# Patient Record
Sex: FEMALE | Race: WHITE | NOT HISPANIC OR LATINO | Employment: OTHER | URBAN - METROPOLITAN AREA
[De-identification: names, ages, dates, MRNs, and addresses within clinical notes are randomized per-mention and may not be internally consistent; named-entity substitution may affect disease eponyms.]

---

## 2017-04-05 ENCOUNTER — OFFICE VISIT (OUTPATIENT)
Dept: URGENT CARE | Facility: CLINIC | Age: 77
End: 2017-04-05
Payer: MEDICARE

## 2017-04-05 VITALS
TEMPERATURE: 97.9 F | RESPIRATION RATE: 16 BRPM | OXYGEN SATURATION: 93 % | HEART RATE: 96 BPM | SYSTOLIC BLOOD PRESSURE: 158 MMHG | BODY MASS INDEX: 28.96 KG/M2 | DIASTOLIC BLOOD PRESSURE: 94 MMHG | WEIGHT: 174 LBS
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
## 2017-04-05 DIAGNOSIS — S86.912A: ICD-10-CM

## 2017-04-05 PROCEDURE — 3017F COLORECTAL CA SCREEN DOC REV: CPT | Mod: 8P | Performed by: PHYSICIAN ASSISTANT

## 2017-04-05 PROCEDURE — G8419 CALC BMI OUT NRM PARAM NOF/U: HCPCS | Performed by: PHYSICIAN ASSISTANT

## 2017-04-05 PROCEDURE — 1101F PT FALLS ASSESS-DOCD LE1/YR: CPT | Mod: 8P | Performed by: PHYSICIAN ASSISTANT

## 2017-04-05 PROCEDURE — 99214 OFFICE O/P EST MOD 30 MIN: CPT | Performed by: PHYSICIAN ASSISTANT

## 2017-04-05 PROCEDURE — G8432 DEP SCR NOT DOC, RNG: HCPCS | Performed by: PHYSICIAN ASSISTANT

## 2017-04-05 PROCEDURE — 4004F PT TOBACCO SCREEN RCVD TLK: CPT | Mod: 8P | Performed by: PHYSICIAN ASSISTANT

## 2017-04-05 PROCEDURE — 4040F PNEUMOC VAC/ADMIN/RCVD: CPT | Mod: 8P | Performed by: PHYSICIAN ASSISTANT

## 2017-04-05 RX ORDER — CYCLOBENZAPRINE HCL 5 MG
5-10 TABLET ORAL 3 TIMES DAILY PRN
Qty: 15 TAB | Refills: 0 | Status: SHIPPED | OUTPATIENT
Start: 2017-04-05 | End: 2020-07-17

## 2017-04-05 RX ORDER — METHYLPREDNISOLONE 4 MG/1
TABLET ORAL
Qty: 1 TAB | Refills: 0 | Status: SHIPPED | OUTPATIENT
Start: 2017-04-05 | End: 2018-01-06

## 2017-04-05 ASSESSMENT — ENCOUNTER SYMPTOMS
WEAKNESS: 1
SENSORY CHANGE: 0
LEG PAIN: 1
MUSCULOSKELETAL NEGATIVE: 1
FOCAL WEAKNESS: 1
NUMBNESS: 0
JOINT SWELLING: 0

## 2017-04-05 NOTE — MR AVS SNAPSHOT
Pilar Montero   2017 8:15 AM   Office Visit   MRN: [id number]    Department:  [facility]   Dept Phone:  [phone]    Description:  Female : 1940   Provider:  Zak Stevens PA-C           Reason for Visit     Leg Pain x 4 days, Lt. leg pain      Allergies as of 2017     Allergen Noted Reactions    Percocet [Apap-Fd&C Red #40 Al Calvert-Oxycodone] 2017         You were diagnosed with     Repetitive strain injury of left lower leg, initial encounter   [[id number]]         Vital Signs     Blood Pressure Pulse Temperature Respirations Weight Oxygen Saturation    158/94 mmHg 96 36.6 °C (97.9 °F) 16 78.926 kg (174 lb) 93%      Basic Information     Date Of Birth Sex Race Ethnicity Preferred Language    1940 Female White Unknown English      Health Maintenance        Date Due Completion Dates    IMM DTaP/Tdap/Td Vaccine (1 - Tdap) 3/28/1961 ---    PAP SMEAR 3/28/1963 ---    MAMMOGRAM 3/28/1982 ---    COLONOSCOPY 3/28/1992 ---    IMM ZOSTER VACCINE 3/28/2002 ---    BONE DENSITY 3/28/2007 ---    IMM PNEUMOCOCCAL 65+ (ADULT) LOW/MEDIUM RISK SERIES (1 of 2 - PCV13) 3/28/2007 ---            Current Immunizations     No immunizations on file.      Below and/or attached are the medications your provider expects you to take. Review all of your home medications and newly ordered medications with your provider and/or pharmacist. Follow medication instructions as directed by your provider and/or pharmacist. Please keep your medication list with you and share with your provider. Update the information when medications are discontinued, doses are changed, or new medications (including over-the-counter products) are added; and carry medication information at all times in the event of emergency situations     Allergies:  PERCOCET - (reactions not documented)               Medications  Valid as of: 2017 -  9:58 AM    Generic Name Brand Name Tablet Size Instructions for use    Acetaminophen-Codeine (Tab) TYLENOL #3 300-30 MG Take 1-2 Tabs by mouth every four hours as needed.        Albuterol Sulfate (Aero Soln) albuterol 108 (90 BASE) MCG/ACT Inhale 2 Puffs by mouth every four hours as needed for Shortness of Breath. or wheezing/cough        Calcium Carb-Cholecalciferol   Take  by mouth.        Cephalexin (Cap) KEFLEX 500 MG Take 1 Cap by mouth 2 times a day.        Cholecalciferol   Take  by mouth.        Cyclobenzaprine HCl (Tab) FLEXERIL 5 MG Take 1-2 Tabs by mouth 3 times a day as needed for Mild Pain or Muscle Spasms.        Glucosamine-Chondroit-Vit C-Mn   Take  by mouth.        MethylPREDNISolone (Tab) MEDROL DOSPACK 4 MG Take  by mouth. U. D.        MethylPREDNISolone (Tablet Therapy Pack) MEDROL DOSEPAK 4 MG U.D.        PredniSONE (Tab) DELTASONE 20 MG Take one tab daily        Promethazine-Codeine (Syrup) PHENERGAN-CODEINE 6.25-10 MG/5ML Take 5-10 mL by mouth 4 times a day as needed for Cough. or use qhs        Pseudoephedrine-Acetaminophen   Take  by mouth.        Pseudoephedrine-APAP-DM   Take  by mouth.        Silver Sulfadiazine (Cream) SILVADENE 1 % Apply thin layer to affected area three times daily        Valsartan-Hydrochlorothiazide   Take  by mouth.        .                 Medicines prescribed today were sent to:     [facility] [address] [place] [address] (S), [place] - [address] [facility] (S) [place] [address]    Phone: [phone] Fax: [phone]    Open 24 Hours?: No      Medication refill instructions:       If your prescription bottle indicates you have medication refills left, it is not necessary to call your provider’s office. Please contact your pharmacy and they will refill your medication.    If your prescription bottle indicates you do not have any refills left, you may request refills at any time through one of the following ways: The online Catchoom system (except Urgent Care), by calling your provider’s office, or by asking your pharmacy to contact  your provider’s office with a refill request. Medication refills are processed only during regular business hours and may not be available until the next business day. Your provider may request additional information or to have a follow-up visit with you prior to refilling your medication.   *Please Note: Medication refills are assigned a new Rx number when refilled electronically. Your pharmacy may indicate that no refills were authorized even though a new prescription for the same medication is available at the pharmacy. Please request the medicine by name with the pharmacy before contacting your provider for a refill.           NuFlick Access Code: [email or web address]  Expires: 5/5/2017  9:58 AM    Your email address is not on file at [facility].  Email Addresses are required for you to sign up for NuFlick, please contact [phone] to verify your personal information and to provide your email address prior to attempting to register for NuFlick.    Pilar Montero  [address] [place] [address]    NuFlick  A secure, online tool to manage your health information     [facility]’s NuFlick® is a secure, online tool that connects you to your personalized health information from the privacy of your home -- day or night - making it very easy for you to manage your healthcare. Once the activation process is completed, you can even access your medical information using the NuFlick renetta, which is available for free in the Apple Renetta store or Google Play store.     To learn more about NuFlick, visit [email or web address]    There are two levels of access available (as shown below):   My Chart Features  [facility] Primary Care Doctor [facility]  Specialists [facility]  Urgent  Care Non-[facility] Primary Care Doctor   Email your healthcare team securely and privately 24/7 X X X    Manage appointments: schedule your next appointment; view details of past/upcoming appointments X      Request prescription refills. X      View recent  personal medical records, including lab and immunizations X X X X   View health record, including health history, allergies, medications X X X X   Read reports about your outpatient visits, procedures, consult and ER notes X X X X   See your discharge summary, which is a recap of your hospital and/or ER visit that includes your diagnosis, lab results, and care plan X X  X     How to register for SkilledWizard:  Once your e-mail address has been verified, follow the following steps to sign up for SkilledWizard.     1. Go to  [email or web address]  2. Click on the Sign Up Now box, which takes you to the New Member Sign Up page. You will need to provide the following information:  a. Enter your SkilledWizard Access Code exactly as it appears at the top of this page. (You will not need to use this code after you’ve completed the sign-up process. If you do not sign up before the expiration date, you must request a new code.)   b. Enter your date of birth.   c. Enter your home email address.   d. Click Submit, and follow the next screen’s instructions.  3. Create a Straker Translationst ID. This will be your SkilledWizard login ID and cannot be changed, so think of one that is secure and easy to remember.  4. Create a SkilledWizard password. You can change your password at any time.  5. Enter your Password Reset Question and Answer. This can be used at a later time if you forget your password.   6. Enter your e-mail address. This allows you to receive e-mail notifications when new information is available in SkilledWizard.  7. Click Sign Up. You can now view your health information.    For assistance activating your SkilledWizard account, call [phone]

## 2017-04-05 NOTE — PROGRESS NOTES
Subjective:      Pilar Montero is a 77 y.o. female who presents with Leg Pain            Leg Pain  This is a new problem. The current episode started in the past 7 days (did vigorous exercise 4d ago, strain of L upper/lower leg; ). The problem occurs constantly. The problem has been unchanged. Associated symptoms include weakness. Pertinent negatives include no joint swelling or numbness. The symptoms are aggravated by bending and walking. She has tried rest for the symptoms. The treatment provided mild relief.       Review of Systems   Musculoskeletal: Negative.  Negative for joint swelling.   Neurological: Positive for focal weakness and weakness. Negative for sensory change and numbness.          Objective:     /94 mmHg  Pulse 96  Temp(Src) 36.6 °C (97.9 °F)  Resp 16  Wt 78.926 kg (174 lb)  SpO2 93%     Physical Exam   Constitutional: She is oriented to person, place, and time. She appears well-developed and well-nourished. No distress.   Musculoskeletal: She exhibits tenderness (gen ttp muscles hip.thigh/calf area; no swell).        Left hip: She exhibits decreased range of motion, decreased strength and tenderness. She exhibits no bony tenderness and no swelling.        Legs:  Neurological: She is alert and oriented to person, place, and time. No sensory deficit. She exhibits abnormal muscle tone. Gait abnormal. Coordination normal.   Skin: Skin is dry.   Psychiatric: She has a normal mood and affect. Her behavior is normal. Judgment and thought content normal.   Nursing note and vitals reviewed.    Filed Vitals:    04/05/17 0831   BP: 158/94   Pulse: 96   Temp: 36.6 °C (97.9 °F)   Resp: 16   Weight: 78.926 kg (174 lb)   SpO2: 93%     Current Outpatient Prescriptions on File Prior to Visit   Medication Sig Dispense Refill   • Valsartan-Hydrochlorothiazide (DIOVAN HCT PO) Take  by mouth.     • predniSONE (DELTASONE) 20 MG Tab Take one tab daily 3 Tab 0   • cephALEXin (KEFLEX) 500 MG Cap Take 1 Cap by  mouth 2 times a day. 14 Cap 0   • silver sulfADIAZINE (SILVADENE) 1 % CREA Apply thin layer to affected area three times daily 60 g 0   • Pseudoephedrine-APAP-DM (DAYQUIL PO) Take  by mouth.     • Pseudoephedrine-APAP (TYLENOL SINUS MAX ST PO) Take  by mouth.     • albuterol (VENTOLIN OR PROVENTIL) 108 (90 BASE) MCG/ACT AERS Inhale 2 Puffs by mouth every four hours as needed for Shortness of Breath. or wheezing/cough 1 Inhaler 0   • promethazine-codeine (PHENERGAN-CODEINE) 6.25-10 MG/5ML SYRP Take 5-10 mL by mouth 4 times a day as needed for Cough. or use qhs 120 mL 0   • methylPREDNISolone (MEDROL DOSPACK) 4 MG TABS Take  by mouth. U. D. 1 Each 0     No current facility-administered medications on file prior to visit.     Gargles, Cepacol lozenges, Aleve/Advil as needed for throat pain  History reviewed. No pertinent family history.  Percocet             Assessment/Plan:     1. Repetitive strain injury of left lower leg, initial encounter     · Flexeril, medrol; T-3 prn  · Heat, rest

## 2018-01-06 ENCOUNTER — OFFICE VISIT (OUTPATIENT)
Dept: URGENT CARE | Facility: CLINIC | Age: 78
End: 2018-01-06
Payer: MEDICARE

## 2018-01-06 VITALS
DIASTOLIC BLOOD PRESSURE: 70 MMHG | HEART RATE: 107 BPM | HEIGHT: 65 IN | BODY MASS INDEX: 28.76 KG/M2 | RESPIRATION RATE: 20 BRPM | SYSTOLIC BLOOD PRESSURE: 122 MMHG | TEMPERATURE: 97 F | WEIGHT: 172.6 LBS | OXYGEN SATURATION: 92 %

## 2018-01-06 DIAGNOSIS — R06.02 SHORTNESS OF BREATH: ICD-10-CM

## 2018-01-06 DIAGNOSIS — R68.89 FLU-LIKE SYMPTOMS: ICD-10-CM

## 2018-01-06 LAB
FLUAV+FLUBV AG SPEC QL IA: NEGATIVE
INT CON NEG: NEGATIVE
INT CON POS: POSITIVE

## 2018-01-06 PROCEDURE — 99214 OFFICE O/P EST MOD 30 MIN: CPT | Performed by: PHYSICIAN ASSISTANT

## 2018-01-06 PROCEDURE — 87804 INFLUENZA ASSAY W/OPTIC: CPT | Performed by: PHYSICIAN ASSISTANT

## 2018-01-06 RX ORDER — BENZONATATE 100 MG/1
100 CAPSULE ORAL 3 TIMES DAILY PRN
Qty: 60 CAP | Refills: 0 | Status: SHIPPED | OUTPATIENT
Start: 2018-01-06 | End: 2020-07-17

## 2018-01-06 RX ORDER — IPRATROPIUM BROMIDE AND ALBUTEROL SULFATE 2.5; .5 MG/3ML; MG/3ML
3 SOLUTION RESPIRATORY (INHALATION) ONCE
Status: COMPLETED | OUTPATIENT
Start: 2018-01-06 | End: 2018-01-06

## 2018-01-06 RX ORDER — CODEINE PHOSPHATE AND GUAIFENESIN 10; 100 MG/5ML; MG/5ML
5 SOLUTION ORAL NIGHTLY PRN
Qty: 50 ML | Refills: 0 | Status: SHIPPED | OUTPATIENT
Start: 2018-01-06 | End: 2018-01-16

## 2018-01-06 RX ORDER — METHYLPREDNISOLONE 4 MG/1
TABLET ORAL
Qty: 1 KIT | Refills: 0 | Status: SHIPPED | OUTPATIENT
Start: 2018-01-06 | End: 2020-07-17

## 2018-01-06 RX ORDER — ALBUTEROL SULFATE 90 UG/1
2 AEROSOL, METERED RESPIRATORY (INHALATION) EVERY 6 HOURS PRN
Qty: 8.5 G | Refills: 3 | Status: SHIPPED | OUTPATIENT
Start: 2018-01-06 | End: 2020-07-17

## 2018-01-06 RX ADMIN — IPRATROPIUM BROMIDE AND ALBUTEROL SULFATE 3 ML: 2.5; .5 SOLUTION RESPIRATORY (INHALATION) at 13:00

## 2018-01-06 ASSESSMENT — ENCOUNTER SYMPTOMS
SPUTUM PRODUCTION: 0
DIARRHEA: 0
SORE THROAT: 1
WEIGHT LOSS: 0
WHEEZING: 0
COUGH: 1
NAUSEA: 0
HEADACHES: 0
HEARTBURN: 0
ABDOMINAL PAIN: 0
VOMITING: 0
DIZZINESS: 0
HEMOPTYSIS: 0
SHORTNESS OF BREATH: 1
FEVER: 1
MYALGIAS: 1
CHILLS: 1

## 2018-01-06 NOTE — PROGRESS NOTES
"Subjective:      Pilar Montero is a 77 y.o. female who presents with Cough (x5days, cough, body aches, chills, headache, sinus congestion)        Patient is accompanied by her .     Cough   This is a new problem. The current episode started in the past 7 days (5 days). The problem has been unchanged. The problem occurs every few minutes. The cough is non-productive. Associated symptoms include chills, a fever, myalgias, nasal congestion, postnasal drip, a sore throat and shortness of breath. Pertinent negatives include no chest pain, ear pain, headaches, heartburn, hemoptysis, weight loss or wheezing. Nothing aggravates the symptoms. She has tried OTC cough suppressant for the symptoms. The treatment provided mild relief. There is no history of asthma, bronchitis, emphysema or pneumonia.     Patient presents to urgent care reporting a 5 day history of fevers, chills, body aches, dry cough, sore throat, and nasal congestion. She has been feeling more SOB with activity as well. She has never been diagnosed with any chronic lung disease, but she has been a smoker for many years. No hemoptysis, night sweats, unintended weight loss, or chest pain.     Review of Systems   Constitutional: Positive for chills and fever. Negative for weight loss.   HENT: Positive for congestion, postnasal drip and sore throat. Negative for ear pain.    Respiratory: Positive for cough and shortness of breath. Negative for hemoptysis, sputum production and wheezing.    Cardiovascular: Negative for chest pain.   Gastrointestinal: Negative for abdominal pain, diarrhea, heartburn, nausea and vomiting.   Genitourinary: Negative.    Musculoskeletal: Positive for myalgias.   Neurological: Negative for dizziness and headaches.        Objective:     /70   Pulse (!) 107   Temp 36.1 °C (97 °F)   Resp 20   Ht 1.651 m (5' 5\")   Wt 78.3 kg (172 lb 9.6 oz)   SpO2 92%   BMI 28.72 kg/m²      Physical Exam   Constitutional: She is oriented " to person, place, and time. She appears well-developed and well-nourished. No distress.   HENT:   Head: Normocephalic and atraumatic.   Right Ear: Hearing, tympanic membrane, external ear and ear canal normal.   Left Ear: Hearing, tympanic membrane, external ear and ear canal normal.   Mouth/Throat: Posterior oropharyngeal erythema present. No oropharyngeal exudate or posterior oropharyngeal edema.   Eyes: Conjunctivae are normal. Pupils are equal, round, and reactive to light. Right eye exhibits no discharge. Left eye exhibits no discharge.   Neck: Normal range of motion.   Cardiovascular: Normal rate, regular rhythm and normal heart sounds.    No murmur heard.  Pulmonary/Chest: Effort normal. No respiratory distress. She has no wheezes. She has no rales.   Decreased lung sounds throughout   Musculoskeletal: Normal range of motion.   Lymphadenopathy:     She has no cervical adenopathy.   Neurological: She is alert and oriented to person, place, and time.   Skin: Skin is warm and dry. She is not diaphoretic.   Psychiatric: She has a normal mood and affect. Her behavior is normal.   Nursing note and vitals reviewed.           PMH:  has no past medical history on file.  MEDS:   Current Outpatient Prescriptions:   •  benzonatate (TESSALON) 100 MG Cap, Take 1 Cap by mouth 3 times a day as needed for Cough., Disp: 60 Cap, Rfl: 0  •  guaifenesin-codeine (GUAIFENESIN AC) Solution oral solution, Take 5 mL by mouth at bedtime as needed for Cough for up to 10 days., Disp: 50 mL, Rfl: 0  •  MethylPREDNISolone (MEDROL DOSEPAK) 4 MG Tablet Therapy Pack, Take as directed, Disp: 1 Kit, Rfl: 0  •  albuterol 108 (90 Base) MCG/ACT Aero Soln inhalation aerosol, Inhale 2 Puffs by mouth every 6 hours as needed for Shortness of Breath., Disp: 8.5 g, Rfl: 3  •  Valsartan-Hydrochlorothiazide (DIOVAN HCT PO), Take  by mouth., Disp: , Rfl:   •  Glucosamine-Chondroit-Vit C-Mn (GLUCOSAMINE 1500 COMPLEX PO), Take  by mouth., Disp: , Rfl:   •   Calcium Carb-Cholecalciferol (CALCIUM 600 + D PO), Take  by mouth., Disp: , Rfl:   •  Cholecalciferol (VITAMIN D PO), Take  by mouth., Disp: , Rfl:   •  acetaminophen-codeine #3 (TYLENOL #3) 300-30 MG Tab, Take 1-2 Tabs by mouth every four hours as needed., Disp: 15 Tab, Rfl: 0  •  cyclobenzaprine (FLEXERIL) 5 MG tablet, Take 1-2 Tabs by mouth 3 times a day as needed for Mild Pain or Muscle Spasms., Disp: 15 Tab, Rfl: 0  •  silver sulfADIAZINE (SILVADENE) 1 % CREA, Apply thin layer to affected area three times daily, Disp: 60 g, Rfl: 0  •  Pseudoephedrine-APAP-DM (DAYQUIL PO), Take  by mouth., Disp: , Rfl:   •  Pseudoephedrine-APAP (TYLENOL SINUS MAX ST PO), Take  by mouth., Disp: , Rfl:   •  albuterol (VENTOLIN OR PROVENTIL) 108 (90 BASE) MCG/ACT AERS, Inhale 2 Puffs by mouth every four hours as needed for Shortness of Breath. or wheezing/cough, Disp: 1 Inhaler, Rfl: 0  ALLERGIES:   Allergies   Allergen Reactions   • Percocet [Apap-Fd&C Red #40 Al Calvert-Oxycodone]      SURGHX: History reviewed. No pertinent surgical history.  SOCHX:  reports that she has been smoking.  She has never used smokeless tobacco.  FH: family history is not on file.    Assessment/Plan:     1. Flu-like symptoms  - POCT Influenza A/B: NEGATIVE  - benzonatate (TESSALON) 100 MG Cap; Take 1 Cap by mouth 3 times a day as needed for Cough.  Dispense: 60 Cap; Refill: 0  - guaifenesin-codeine (GUAIFENESIN AC) Solution oral solution; Take 5 mL by mouth at bedtime as needed for Cough for up to 10 days.  Dispense: 50 mL; Refill: 0   - Will cause sedation, avoid driving, operating heavy machinery, and drinking alcohol    2. Shortness of breath    - ipratropium-albuterol (DUONEB) nebulizer solution 3 mL; 3 mL by Nebulization route Once.   - Given in clinic with significant relief of symptoms. Pulse ox was 88% upon arrival, increased to 92% on RA after breathing treatment.  - MethylPREDNISolone (MEDROL DOSEPAK) 4 MG Tablet Therapy Pack; Take as directed   Dispense: 1 Kit; Refill: 0  - albuterol 108 (90 Base) MCG/ACT Aero Soln inhalation aerosol; Inhale 2 Puffs by mouth every 6 hours as needed for Shortness of Breath.  Dispense: 8.5 g; Refill: 3    Advised patient symptoms are most likely viral in etiology, recommend supportive care. Increased fluids and rest. Call or return to office if symptoms persist or worsen. The patient demonstrated a good understanding and agreed with the treatment plan.

## 2019-01-21 ENCOUNTER — OFFICE VISIT (OUTPATIENT)
Dept: URGENT CARE | Facility: MEDICAL CENTER | Age: 79
End: 2019-01-21
Payer: MEDICARE

## 2019-01-21 ENCOUNTER — HOSPITAL ENCOUNTER (OUTPATIENT)
Dept: RADIOLOGY | Facility: MEDICAL CENTER | Age: 79
End: 2019-01-21
Attending: NURSE PRACTITIONER
Payer: MEDICARE

## 2019-01-21 ENCOUNTER — TELEPHONE (OUTPATIENT)
Dept: URGENT CARE | Facility: MEDICAL CENTER | Age: 79
End: 2019-01-21

## 2019-01-21 VITALS
SYSTOLIC BLOOD PRESSURE: 146 MMHG | BODY MASS INDEX: 29.79 KG/M2 | TEMPERATURE: 98.3 F | WEIGHT: 179 LBS | OXYGEN SATURATION: 93 % | HEART RATE: 95 BPM | DIASTOLIC BLOOD PRESSURE: 108 MMHG

## 2019-01-21 DIAGNOSIS — R06.02 SOB (SHORTNESS OF BREATH): ICD-10-CM

## 2019-01-21 DIAGNOSIS — R50.9 FEVER, UNSPECIFIED FEVER CAUSE: ICD-10-CM

## 2019-01-21 DIAGNOSIS — R05.9 COUGH: ICD-10-CM

## 2019-01-21 DIAGNOSIS — J44.1 CHRONIC OBSTRUCTIVE PULMONARY DISEASE WITH ACUTE EXACERBATION (HCC): ICD-10-CM

## 2019-01-21 DIAGNOSIS — J22 LOWER RESPIRATORY INFECTION (E.G., BRONCHITIS, PNEUMONIA, PNEUMONITIS, PULMONITIS): ICD-10-CM

## 2019-01-21 LAB
FLUAV+FLUBV AG SPEC QL IA: NORMAL
INT CON NEG: NORMAL
INT CON POS: NORMAL

## 2019-01-21 PROCEDURE — 71046 X-RAY EXAM CHEST 2 VIEWS: CPT

## 2019-01-21 PROCEDURE — 87804 INFLUENZA ASSAY W/OPTIC: CPT | Performed by: NURSE PRACTITIONER

## 2019-01-21 PROCEDURE — 99214 OFFICE O/P EST MOD 30 MIN: CPT | Performed by: NURSE PRACTITIONER

## 2019-01-21 RX ORDER — IPRATROPIUM BROMIDE AND ALBUTEROL SULFATE 2.5; .5 MG/3ML; MG/3ML
3 SOLUTION RESPIRATORY (INHALATION) ONCE
Status: COMPLETED | OUTPATIENT
Start: 2019-01-21 | End: 2019-01-21

## 2019-01-21 RX ORDER — METHYLPREDNISOLONE 4 MG/1
TABLET ORAL
Qty: 1 KIT | Refills: 0 | Status: SHIPPED | OUTPATIENT
Start: 2019-01-21 | End: 2020-07-17

## 2019-01-21 RX ORDER — ALBUTEROL SULFATE 90 UG/1
2 AEROSOL, METERED RESPIRATORY (INHALATION) EVERY 6 HOURS PRN
Qty: 8.5 G | Refills: 0 | Status: SHIPPED | OUTPATIENT
Start: 2019-01-21 | End: 2020-07-17

## 2019-01-21 RX ORDER — LOSARTAN POTASSIUM AND HYDROCHLOROTHIAZIDE 25; 100 MG/1; MG/1
1 TABLET ORAL DAILY
COMMUNITY
End: 2020-07-17

## 2019-01-21 RX ORDER — DOXYCYCLINE HYCLATE 100 MG
100 TABLET ORAL 2 TIMES DAILY
Qty: 20 TAB | Refills: 0 | Status: SHIPPED | OUTPATIENT
Start: 2019-01-21 | End: 2019-01-31

## 2019-01-21 RX ORDER — BENZONATATE 100 MG/1
100 CAPSULE ORAL 3 TIMES DAILY PRN
Qty: 60 CAP | Refills: 0 | Status: SHIPPED | OUTPATIENT
Start: 2019-01-21 | End: 2020-07-17

## 2019-01-21 RX ADMIN — IPRATROPIUM BROMIDE AND ALBUTEROL SULFATE 3 ML: 2.5; .5 SOLUTION RESPIRATORY (INHALATION) at 15:41

## 2019-01-21 ASSESSMENT — ENCOUNTER SYMPTOMS
VOMITING: 0
SORE THROAT: 0
ABDOMINAL PAIN: 0
NAUSEA: 0
SINUS PAIN: 1
CONSTIPATION: 0
TINGLING: 0
NECK PAIN: 0
DIZZINESS: 0
WEAKNESS: 1
PALPITATIONS: 0
DIARRHEA: 0
SENSORY CHANGE: 0
HEADACHES: 1
COUGH: 1
MYALGIAS: 0
SHORTNESS OF BREATH: 1
SPUTUM PRODUCTION: 0
CHILLS: 1
FEVER: 1
WHEEZING: 0
ORTHOPNEA: 0

## 2019-01-21 NOTE — PROGRESS NOTES
"Subjective:      Pilar Montero is a 78 y.o. female who presents with Cough (chest congestion, cough, hard to breathe, body aches, headache, cheakbone and eyes sore, )            HPI  Dry hacking cough x 3 days, SOB/chest tightness, smoker, denies asthma. Albuterol, Dulera, out of these meds. Last CXR in 2016. Seen by PCP in 11/2018 with blood work done, states \"no problems\".  C/o sinus HA, nasal congestion/PND, sinus facial pressure, no sore throat. Denies taking OTC decongestants. Has not seen pulmonology for COPD status. Had similar episode of SOB with cough last year at this time.     PMH:  has no past medical history on file.  MEDS:   Current Outpatient Prescriptions:   •  losartan-hydrochlorothiazide (HYZAAR) 100-25 MG per tablet, Take 1 Tab by mouth every day., Disp: , Rfl:   •  Glucosamine-Chondroit-Vit C-Mn (GLUCOSAMINE 1500 COMPLEX PO), Take  by mouth., Disp: , Rfl:   •  Glucosamine-Chondroit-Vit C-Mn (GLUCOSAMINE 1500 COMPLEX PO), Take  by mouth., Disp: , Rfl:   •  Calcium Carb-Cholecalciferol (CALCIUM 600 + D PO), Take  by mouth., Disp: , Rfl:   •  Cholecalciferol (VITAMIN D PO), Take  by mouth., Disp: , Rfl:   •  benzonatate (TESSALON) 100 MG Cap, Take 1 Cap by mouth 3 times a day as needed for Cough. (Patient not taking: Reported on 1/21/2019), Disp: 60 Cap, Rfl: 0  •  MethylPREDNISolone (MEDROL DOSEPAK) 4 MG Tablet Therapy Pack, Take as directed (Patient not taking: Reported on 1/21/2019), Disp: 1 Kit, Rfl: 0  •  albuterol 108 (90 Base) MCG/ACT Aero Soln inhalation aerosol, Inhale 2 Puffs by mouth every 6 hours as needed for Shortness of Breath. (Patient not taking: Reported on 1/21/2019), Disp: 8.5 g, Rfl: 3  •  acetaminophen-codeine #3 (TYLENOL #3) 300-30 MG Tab, Take 1-2 Tabs by mouth every four hours as needed. (Patient not taking: Reported on 1/21/2019), Disp: 15 Tab, Rfl: 0  •  cyclobenzaprine (FLEXERIL) 5 MG tablet, Take 1-2 Tabs by mouth 3 times a day as needed for Mild Pain or Muscle Spasms. " (Patient not taking: Reported on 1/21/2019), Disp: 15 Tab, Rfl: 0  •  silver sulfADIAZINE (SILVADENE) 1 % CREA, Apply thin layer to affected area three times daily (Patient not taking: Reported on 1/21/2019), Disp: 60 g, Rfl: 0  •  Valsartan-Hydrochlorothiazide (DIOVAN HCT PO), Take  by mouth., Disp: , Rfl:   •  Pseudoephedrine-APAP-DM (DAYQUIL PO), Take  by mouth., Disp: , Rfl:   •  Pseudoephedrine-APAP (TYLENOL SINUS MAX ST PO), Take  by mouth., Disp: , Rfl:   •  albuterol (VENTOLIN OR PROVENTIL) 108 (90 BASE) MCG/ACT AERS, Inhale 2 Puffs by mouth every four hours as needed for Shortness of Breath. or wheezing/cough (Patient not taking: Reported on 1/21/2019), Disp: 1 Inhaler, Rfl: 0  ALLERGIES:   Allergies   Allergen Reactions   • Percocet [Apap-Fd&C Red #40 Al Calvert-Oxycodone]      SURGHX: History reviewed. No pertinent surgical history.  SOCHX:  reports that she has been smoking Cigarettes.  She has been smoking about 0.50 packs per day. She has never used smokeless tobacco. She reports that she drinks alcohol. She reports that she does not use drugs.  FH: Family history was reviewed, no pertinent findings to report    Review of Systems   Constitutional: Positive for chills, fever and malaise/fatigue.   HENT: Positive for congestion, ear pain and sinus pain. Negative for sore throat.    Respiratory: Positive for cough and shortness of breath. Negative for sputum production and wheezing.    Cardiovascular: Negative for chest pain, palpitations and orthopnea.   Gastrointestinal: Negative for abdominal pain, constipation, diarrhea, nausea and vomiting.   Musculoskeletal: Negative for myalgias and neck pain.   Neurological: Positive for weakness and headaches. Negative for dizziness, tingling and sensory change.   All other systems reviewed and are negative.         Objective:     /108   Pulse 95   Temp 36.8 °C (98.3 °F) (Temporal)   Wt 81.2 kg (179 lb)   SpO2 93%   BMI 29.79 kg/m²      Physical Exam    Constitutional: She is oriented to person, place, and time. She appears well-developed and well-nourished. She is active and cooperative.  Non-toxic appearance. She does not have a sickly appearance. She appears ill. No distress.   HENT:   Head: Normocephalic.   Right Ear: Tympanic membrane, external ear and ear canal normal.   Left Ear: Tympanic membrane, external ear and ear canal normal.   Nose: Mucosal edema and rhinorrhea present.   Mouth/Throat: Uvula is midline. Mucous membranes are dry. No uvula swelling. No posterior oropharyngeal edema or posterior oropharyngeal erythema.   Eyes: Pupils are equal, round, and reactive to light. Conjunctivae and EOM are normal. Right eye exhibits no discharge. Left eye exhibits no discharge.   Neck: Normal range of motion.   Cardiovascular: Normal rate and regular rhythm.    Pulmonary/Chest: Effort normal. No accessory muscle usage. No respiratory distress. She has decreased breath sounds in the right upper field, the right middle field, the right lower field, the left upper field, the left middle field and the left lower field. She has no wheezes. She has no rhonchi. She has no rales.   Abdominal: Soft. Bowel sounds are normal. She exhibits no distension. There is no tenderness. There is no rebound and no guarding.   Musculoskeletal: Normal range of motion.   Lymphadenopathy:     She has no cervical adenopathy.   Neurological: She is alert and oriented to person, place, and time. She has normal strength. She is not disoriented. No cranial nerve deficit or sensory deficit. Coordination and gait normal. GCS eye subscore is 4. GCS verbal subscore is 5. GCS motor subscore is 6.   Skin: Skin is warm and dry. She is not diaphoretic.   Psychiatric: Judgment and thought content normal. Her mood appears anxious. Her speech is rapid and/or pressured. She is agitated. She is not actively hallucinating. Cognition and memory are normal.   Appears uncomfortable due to SOB, chest  "tightness. Patient did not want to do CXR at this time and wanted to do at later time.  convinced patient to \"go next door since we are already here\". Encouraged CXR to be done in order to provide lung status for infection. Agreed to go \"if the wait isn't too long\". She is attentive.   Vitals reviewed.         Duo Neb breathing treatment: Patient has chest tightness, SOB without wheeze or CP. Cough induced especially with deep breathing. After, patient states able to breathe better with mild coughing still. Air movement better.     CXR:Diffuse interstitial prominence could relate to mild pulmonary edema or atypical infection.  Stable mild cardiomegaly.  Assessment/Plan:     1. Cough    - POCT Influenza A/B: NEG  - DX-CHEST-2 VIEWS; Future  - benzonatate (TESSALON) 100 MG Cap; Take 1 Cap by mouth 3 times a day as needed for Cough.  Dispense: 60 Cap; Refill: 0    2. Fever, unspecified fever cause    - POCT Influenza A/B    3. SOB (shortness of breath)    - DX-CHEST-2 VIEWS; Future  - ipratropium-albuterol (DUONEB) nebulizer solution; 3 mL by Nebulization route Once.  - albuterol 108 (90 Base) MCG/ACT Aero Soln inhalation aerosol; Inhale 2 Puffs by mouth every 6 hours as needed for Shortness of Breath.  Dispense: 8.5 g; Refill: 0  - Mometasone Furo-Formoterol Fum 200-5 MCG/ACT Aerosol; Inhale 2 Puffs by mouth 2 Times a Day.  Dispense: 1 Inhaler; Refill: 0  - MethylPREDNISolone (MEDROL DOSEPAK) 4 MG Tablet Therapy Pack; Use as directed  Dispense: 1 Kit; Refill: 0    4. Chronic obstructive pulmonary disease with acute exacerbation (HCC)    - ipratropium-albuterol (DUONEB) nebulizer solution; 3 mL by Nebulization route Once.  - albuterol 108 (90 Base) MCG/ACT Aero Soln inhalation aerosol; Inhale 2 Puffs by mouth every 6 hours as needed for Shortness of Breath.  Dispense: 8.5 g; Refill: 0  - Mometasone Furo-Formoterol Fum 200-5 MCG/ACT Aerosol; Inhale 2 Puffs by mouth 2 Times a Day.  Dispense: 1 Inhaler; Refill: " 0    5. Lower respiratory infection (e.g., bronchitis, pneumonia, pneumonitis, pulmonitis)    - doxycycline (VIBRAMYCIN) 100 MG Tab; Take 1 Tab by mouth 2 times a day for 10 days.  Dispense: 20 Tab; Refill: 0    Increase water intake  May use Ibuprofen/Tylenol prn for fever or body aches  Get rest  May use saline nasal spray/flonase prn to flush nasal congestion   Use inhalers as directed for SOB, chest tightness with cough  Recommend smoking cessation  May use OTC cough suppressant medications like Robitussin/Delsym prn  Monitor for fevers, productive cough, SOB, CP, chest tightness, malaise- need re-evaluation    Call [phone] cell or home number for CXR results

## 2019-01-23 ENCOUNTER — TELEPHONE (OUTPATIENT)
Dept: URGENT CARE | Facility: MEDICAL CENTER | Age: 79
End: 2019-01-23

## 2019-01-23 NOTE — TELEPHONE ENCOUNTER
Patient that was seen on moday, Pilar Montero, called because they couldn't receive the prescription for Mometasone Furo-Formoterol Fum 200-5 MCG/ACT  from the [facility] pharmacy on [address] [facility] at [phone] . I called the pharmacy to see what the reason was and they need the prescriber, rick Walsh,   to change it from 200-5 mcg/act to what it comes in.  I told them they would receive a call back once it has been resolved.

## 2019-01-24 NOTE — TELEPHONE ENCOUNTER
What does it come in? It comes in 200 mcg and 100 mcg, I am not understanding the request. Please clarify. Thank you.  Cleo

## 2019-01-29 ENCOUNTER — HOSPITAL ENCOUNTER (INPATIENT)
Dept: HOSPITAL 8 - ED | Age: 79
LOS: 2 days | Discharge: HOME | DRG: 291 | End: 2019-01-31
Attending: HOSPITALIST | Admitting: HOSPITALIST
Payer: MEDICARE

## 2019-01-29 VITALS — DIASTOLIC BLOOD PRESSURE: 84 MMHG | SYSTOLIC BLOOD PRESSURE: 132 MMHG

## 2019-01-29 VITALS — BODY MASS INDEX: 32.18 KG/M2 | HEIGHT: 65 IN | WEIGHT: 193.12 LBS

## 2019-01-29 VITALS — DIASTOLIC BLOOD PRESSURE: 86 MMHG | SYSTOLIC BLOOD PRESSURE: 145 MMHG

## 2019-01-29 DIAGNOSIS — J44.9: ICD-10-CM

## 2019-01-29 DIAGNOSIS — I48.91: ICD-10-CM

## 2019-01-29 DIAGNOSIS — Z88.8: ICD-10-CM

## 2019-01-29 DIAGNOSIS — I11.0: Primary | ICD-10-CM

## 2019-01-29 DIAGNOSIS — I31.3: ICD-10-CM

## 2019-01-29 DIAGNOSIS — D68.69: ICD-10-CM

## 2019-01-29 DIAGNOSIS — E78.5: ICD-10-CM

## 2019-01-29 DIAGNOSIS — Z87.891: ICD-10-CM

## 2019-01-29 DIAGNOSIS — I50.33: ICD-10-CM

## 2019-01-29 DIAGNOSIS — E55.9: ICD-10-CM

## 2019-01-29 DIAGNOSIS — E78.00: ICD-10-CM

## 2019-01-29 DIAGNOSIS — J96.20: ICD-10-CM

## 2019-01-29 DIAGNOSIS — I07.1: ICD-10-CM

## 2019-01-29 LAB
ALBUMIN SERPL-MCNC: 3.1 G/DL (ref 3.4–5)
ANION GAP SERPL CALC-SCNC: 6 MMOL/L (ref 5–15)
BASOPHILS # BLD AUTO: 0.04 X10^3/UL (ref 0–0.1)
BASOPHILS NFR BLD AUTO: 1 % (ref 0–1)
CALCIUM SERPL-MCNC: 8.7 MG/DL (ref 8.5–10.1)
CHLORIDE SERPL-SCNC: 104 MMOL/L (ref 98–107)
CHOL/HDL RATIO: 3
CREAT SERPL-MCNC: 0.54 MG/DL (ref 0.55–1.02)
CULTURE INDICATED?: NO
EOSINOPHIL # BLD AUTO: 0.05 X10^3/UL (ref 0–0.4)
EOSINOPHIL NFR BLD AUTO: 1 % (ref 1–7)
ERYTHROCYTE [DISTWIDTH] IN BLOOD BY AUTOMATED COUNT: 13.8 % (ref 9.6–15.2)
EST. AVERAGE GLUCOSE BLD GHB EST-MCNC: 134 MG/DL (ref 0–126)
HBA1C MFR BLD: 6.3 % (ref 4.2–6.3)
HDL CHOL %: 33 % (ref 28–40)
HDL CHOLESTEROL (DIRECT): 43 MG/DL (ref 40–60)
INR PPP: 1.09 (ref 0.93–1.1)
LDL CHOLESTEROL,CALCULATED: 73 MG/DL (ref 54–169)
LDLC/HDLC SERPL: 1.7 {RATIO} (ref 0.5–3)
LYMPHOCYTES # BLD AUTO: 1.08 X10^3/UL (ref 1–3.4)
LYMPHOCYTES NFR BLD AUTO: 13 % (ref 22–44)
MCH RBC QN AUTO: 34 PG (ref 27–34.8)
MCHC RBC AUTO-ENTMCNC: 33.7 G/DL (ref 32.4–35.8)
MCV RBC AUTO: 100.8 FL (ref 80–100)
MD: NO
MICROSCOPIC: (no result)
MONOCYTES # BLD AUTO: 0.83 X10^3/UL (ref 0.2–0.8)
MONOCYTES NFR BLD AUTO: 10 % (ref 2–9)
NEUTROPHILS # BLD AUTO: 6.11 X10^3/UL (ref 1.8–6.8)
NEUTROPHILS NFR BLD AUTO: 75 % (ref 42–75)
PLATELET # BLD AUTO: 357 X10^3/UL (ref 130–400)
PMV BLD AUTO: 7.3 FL (ref 7.4–10.4)
PROTHROMBIN TIME: 11.5 SECONDS (ref 9.6–11.5)
RBC # BLD AUTO: 4.56 X10^6/UL (ref 3.82–5.3)
T4 FREE SERPL-MCNC: 1.1 NG/DL (ref 0.76–1.46)
TRIGL SERPL-MCNC: 64 MG/DL (ref 50–200)
TROPONIN I SERPL-MCNC: 0.03 NG/ML (ref 0–0.04)
TROPONIN I SERPL-MCNC: 0.04 NG/ML (ref 0–0.04)
TSH SERPL-ACNC: 1.61 MIU/L (ref 0.36–3.74)
VLDLC SERPL CALC-MCNC: 13 MG/DL (ref 0–25)

## 2019-01-29 PROCEDURE — C9898 INPNT STAY RADIOLABELED ITEM: HCPCS

## 2019-01-29 PROCEDURE — 80061 LIPID PANEL: CPT

## 2019-01-29 PROCEDURE — 85025 COMPLETE CBC W/AUTO DIFF WBC: CPT

## 2019-01-29 PROCEDURE — 87400 INFLUENZA A/B EACH AG IA: CPT

## 2019-01-29 PROCEDURE — 80048 BASIC METABOLIC PNL TOTAL CA: CPT

## 2019-01-29 PROCEDURE — 83735 ASSAY OF MAGNESIUM: CPT

## 2019-01-29 PROCEDURE — 99291 CRITICAL CARE FIRST HOUR: CPT

## 2019-01-29 PROCEDURE — 84443 ASSAY THYROID STIM HORMONE: CPT

## 2019-01-29 PROCEDURE — 84484 ASSAY OF TROPONIN QUANT: CPT

## 2019-01-29 PROCEDURE — 84439 ASSAY OF FREE THYROXINE: CPT

## 2019-01-29 PROCEDURE — 83880 ASSAY OF NATRIURETIC PEPTIDE: CPT

## 2019-01-29 PROCEDURE — 83605 ASSAY OF LACTIC ACID: CPT

## 2019-01-29 PROCEDURE — 85610 PROTHROMBIN TIME: CPT

## 2019-01-29 PROCEDURE — 82040 ASSAY OF SERUM ALBUMIN: CPT

## 2019-01-29 PROCEDURE — A9502 TC99M TETROFOSMIN: HCPCS

## 2019-01-29 PROCEDURE — 83036 HEMOGLOBIN GLYCOSYLATED A1C: CPT

## 2019-01-29 PROCEDURE — 71045 X-RAY EXAM CHEST 1 VIEW: CPT

## 2019-01-29 PROCEDURE — 94664 DEMO&/EVAL PT USE INHALER: CPT

## 2019-01-29 PROCEDURE — 36415 COLL VENOUS BLD VENIPUNCTURE: CPT

## 2019-01-29 PROCEDURE — 81003 URINALYSIS AUTO W/O SCOPE: CPT

## 2019-01-29 PROCEDURE — 78452 HT MUSCLE IMAGE SPECT MULT: CPT

## 2019-01-29 PROCEDURE — 93017 CV STRESS TEST TRACING ONLY: CPT

## 2019-01-29 PROCEDURE — 96374 THER/PROPH/DIAG INJ IV PUSH: CPT

## 2019-01-29 PROCEDURE — 93005 ELECTROCARDIOGRAM TRACING: CPT

## 2019-01-29 PROCEDURE — 93306 TTE W/DOPPLER COMPLETE: CPT

## 2019-01-29 PROCEDURE — 94640 AIRWAY INHALATION TREATMENT: CPT

## 2019-01-29 RX ADMIN — APIXABAN SCH MG: 5 TABLET, FILM COATED ORAL at 11:42

## 2019-01-29 RX ADMIN — APIXABAN SCH MG: 5 TABLET, FILM COATED ORAL at 20:06

## 2019-01-29 RX ADMIN — DILTIAZEM HYDROCHLORIDE SCH MLS/HR: 5 INJECTION INTRAVENOUS at 19:38

## 2019-01-29 RX ADMIN — METOPROLOL TARTRATE SCH MG: 25 TABLET, FILM COATED ORAL at 20:07

## 2019-01-29 RX ADMIN — DILTIAZEM HYDROCHLORIDE SCH MLS/HR: 5 INJECTION INTRAVENOUS at 09:18

## 2019-01-29 RX ADMIN — METOPROLOL TARTRATE SCH MG: 25 TABLET, FILM COATED ORAL at 13:51

## 2019-01-29 RX ADMIN — IPRATROPIUM BROMIDE AND ALBUTEROL SULFATE SCH ML: 2.5; .5 SOLUTION RESPIRATORY (INHALATION) at 21:00

## 2019-01-29 RX ADMIN — SODIUM CHLORIDE, PRESERVATIVE FREE SCH ML: 5 INJECTION INTRAVENOUS at 20:08

## 2019-01-29 RX ADMIN — SODIUM CHLORIDE, PRESERVATIVE FREE SCH ML: 5 INJECTION INTRAVENOUS at 10:30

## 2019-01-29 NOTE — NUR
PT SEEN AT  AND PRESCRIBED DOXY, TESSALON, AND INH FOR POSS PNM.  PT RPTS NO 
IMPROVEMENT AND FEELING OF HEART RACING INTERMITTANTLY.  SERA DICK AT BEDSIDE, 
ASSESSMENT, POC DISCUSSED AND ORDERS REC'D.

## 2019-01-29 NOTE — NUR
PT OOB AND AMBULATED TO BATHROOM UPRIGHT STEADY GAIT.  INCREASED SOB WITH 
ACTIVITY.  PT RTD TO ROOM W/O INCIDENT.  CALL LIGHT W/I REACH, VSS

## 2019-01-29 NOTE — NUR
PT MED AS NOTED WITH DILTIZEM WITH EFFECT.  DILT GTT STARTED AT 10MG/HR AND 
INFUSING W/O DIFFICULTY.  CALL LIGHT W/I REACH, SPOUSE AT BEDSIDE.

## 2019-01-30 VITALS — SYSTOLIC BLOOD PRESSURE: 138 MMHG | DIASTOLIC BLOOD PRESSURE: 85 MMHG

## 2019-01-30 VITALS — SYSTOLIC BLOOD PRESSURE: 148 MMHG | DIASTOLIC BLOOD PRESSURE: 88 MMHG

## 2019-01-30 VITALS — DIASTOLIC BLOOD PRESSURE: 78 MMHG | SYSTOLIC BLOOD PRESSURE: 136 MMHG

## 2019-01-30 VITALS — SYSTOLIC BLOOD PRESSURE: 110 MMHG | DIASTOLIC BLOOD PRESSURE: 71 MMHG

## 2019-01-30 LAB
ALBUMIN SERPL-MCNC: 2.7 G/DL (ref 3.4–5)
ANION GAP SERPL CALC-SCNC: 5 MMOL/L (ref 5–15)
BASOPHILS # BLD AUTO: 0.04 X10^3/UL (ref 0–0.1)
BASOPHILS NFR BLD AUTO: 1 % (ref 0–1)
CALCIUM SERPL-MCNC: 8.4 MG/DL (ref 8.5–10.1)
CHLORIDE SERPL-SCNC: 104 MMOL/L (ref 98–107)
CREAT SERPL-MCNC: 0.5 MG/DL (ref 0.55–1.02)
EOSINOPHIL # BLD AUTO: 0.1 X10^3/UL (ref 0–0.4)
EOSINOPHIL NFR BLD AUTO: 2 % (ref 1–7)
ERYTHROCYTE [DISTWIDTH] IN BLOOD BY AUTOMATED COUNT: 13.9 % (ref 9.6–15.2)
LYMPHOCYTES # BLD AUTO: 0.92 X10^3/UL (ref 1–3.4)
LYMPHOCYTES NFR BLD AUTO: 17 % (ref 22–44)
MCH RBC QN AUTO: 33.6 PG (ref 27–34.8)
MCHC RBC AUTO-ENTMCNC: 33.1 G/DL (ref 32.4–35.8)
MCV RBC AUTO: 101.6 FL (ref 80–100)
MD: NO
MONOCYTES # BLD AUTO: 0.74 X10^3/UL (ref 0.2–0.8)
MONOCYTES NFR BLD AUTO: 13 % (ref 2–9)
NEUTROPHILS # BLD AUTO: 3.77 X10^3/UL (ref 1.8–6.8)
NEUTROPHILS NFR BLD AUTO: 68 % (ref 42–75)
PLATELET # BLD AUTO: 304 X10^3/UL (ref 130–400)
PMV BLD AUTO: 7.1 FL (ref 7.4–10.4)
RBC # BLD AUTO: 4.21 X10^6/UL (ref 3.82–5.3)
TROPONIN I SERPL-MCNC: 0.03 NG/ML (ref 0–0.04)

## 2019-01-30 RX ADMIN — IPRATROPIUM BROMIDE AND ALBUTEROL SULFATE SCH ML: 2.5; .5 SOLUTION RESPIRATORY (INHALATION) at 20:29

## 2019-01-30 RX ADMIN — METOPROLOL TARTRATE SCH MG: 25 TABLET, FILM COATED ORAL at 15:19

## 2019-01-30 RX ADMIN — METOPROLOL TARTRATE SCH MG: 25 TABLET, FILM COATED ORAL at 04:52

## 2019-01-30 RX ADMIN — APIXABAN SCH MG: 5 TABLET, FILM COATED ORAL at 20:13

## 2019-01-30 RX ADMIN — IPRATROPIUM BROMIDE AND ALBUTEROL SULFATE SCH ML: 2.5; .5 SOLUTION RESPIRATORY (INHALATION) at 15:00

## 2019-01-30 RX ADMIN — METOPROLOL TARTRATE SCH MG: 25 TABLET, FILM COATED ORAL at 20:13

## 2019-01-30 RX ADMIN — IPRATROPIUM BROMIDE AND ALBUTEROL SULFATE SCH ML: 2.5; .5 SOLUTION RESPIRATORY (INHALATION) at 07:17

## 2019-01-30 RX ADMIN — IPRATROPIUM BROMIDE AND ALBUTEROL SULFATE SCH ML: 2.5; .5 SOLUTION RESPIRATORY (INHALATION) at 03:00

## 2019-01-30 RX ADMIN — SODIUM CHLORIDE, PRESERVATIVE FREE SCH ML: 5 INJECTION INTRAVENOUS at 20:13

## 2019-01-30 RX ADMIN — SODIUM CHLORIDE, PRESERVATIVE FREE SCH ML: 5 INJECTION INTRAVENOUS at 07:49

## 2019-01-30 RX ADMIN — APIXABAN SCH MG: 5 TABLET, FILM COATED ORAL at 07:49

## 2019-01-30 RX ADMIN — DILTIAZEM HYDROCHLORIDE SCH MLS/HR: 5 INJECTION INTRAVENOUS at 06:06

## 2019-01-31 VITALS — SYSTOLIC BLOOD PRESSURE: 126 MMHG | DIASTOLIC BLOOD PRESSURE: 94 MMHG

## 2019-01-31 VITALS — SYSTOLIC BLOOD PRESSURE: 150 MMHG | DIASTOLIC BLOOD PRESSURE: 81 MMHG

## 2019-01-31 VITALS — DIASTOLIC BLOOD PRESSURE: 105 MMHG | SYSTOLIC BLOOD PRESSURE: 135 MMHG

## 2019-01-31 RX ADMIN — METOPROLOL TARTRATE SCH MG: 25 TABLET, FILM COATED ORAL at 02:58

## 2019-01-31 RX ADMIN — METOPROLOL TARTRATE SCH MG: 25 TABLET, FILM COATED ORAL at 11:32

## 2019-01-31 RX ADMIN — APIXABAN SCH MG: 5 TABLET, FILM COATED ORAL at 11:31

## 2019-01-31 RX ADMIN — SODIUM CHLORIDE, PRESERVATIVE FREE SCH ML: 5 INJECTION INTRAVENOUS at 09:00

## 2019-01-31 RX ADMIN — IPRATROPIUM BROMIDE AND ALBUTEROL SULFATE SCH ML: 2.5; .5 SOLUTION RESPIRATORY (INHALATION) at 13:51

## 2019-01-31 RX ADMIN — IPRATROPIUM BROMIDE AND ALBUTEROL SULFATE SCH ML: 2.5; .5 SOLUTION RESPIRATORY (INHALATION) at 02:36

## 2019-01-31 RX ADMIN — IPRATROPIUM BROMIDE AND ALBUTEROL SULFATE SCH ML: 2.5; .5 SOLUTION RESPIRATORY (INHALATION) at 06:57

## 2019-03-15 ENCOUNTER — HOSPITAL ENCOUNTER (OUTPATIENT)
Dept: HOSPITAL 8 - CACL | Age: 79
Discharge: HOME | End: 2019-03-15
Attending: INTERNAL MEDICINE
Payer: MEDICARE

## 2019-03-15 VITALS — BODY MASS INDEX: 28.03 KG/M2 | WEIGHT: 168.21 LBS | HEIGHT: 65 IN

## 2019-03-15 VITALS — DIASTOLIC BLOOD PRESSURE: 106 MMHG | SYSTOLIC BLOOD PRESSURE: 161 MMHG

## 2019-03-15 DIAGNOSIS — I11.0: ICD-10-CM

## 2019-03-15 DIAGNOSIS — I48.91: Primary | ICD-10-CM

## 2019-03-15 DIAGNOSIS — Z79.01: ICD-10-CM

## 2019-03-15 DIAGNOSIS — J44.9: ICD-10-CM

## 2019-03-15 DIAGNOSIS — Z88.6: ICD-10-CM

## 2019-03-15 DIAGNOSIS — I50.33: ICD-10-CM

## 2019-03-15 DIAGNOSIS — Z88.8: ICD-10-CM

## 2019-03-15 DIAGNOSIS — Z88.1: ICD-10-CM

## 2019-03-15 DIAGNOSIS — F17.210: ICD-10-CM

## 2019-03-15 DIAGNOSIS — I50.84: ICD-10-CM

## 2019-03-15 LAB
ANION GAP SERPL CALC-SCNC: 4 MMOL/L (ref 5–15)
CALCIUM SERPL-MCNC: 8.2 MG/DL (ref 8.5–10.1)
CHLORIDE SERPL-SCNC: 104 MMOL/L (ref 98–107)
CREAT SERPL-MCNC: 0.49 MG/DL (ref 0.55–1.02)
INR PPP: 1.19 (ref 0.93–1.1)
PROTHROMBIN TIME: 12.4 SECONDS (ref 9.6–11.5)

## 2019-03-15 PROCEDURE — 36415 COLL VENOUS BLD VENIPUNCTURE: CPT

## 2019-03-15 PROCEDURE — 92960 CARDIOVERSION ELECTRIC EXT: CPT

## 2019-03-15 PROCEDURE — 80048 BASIC METABOLIC PNL TOTAL CA: CPT

## 2019-03-15 PROCEDURE — 85610 PROTHROMBIN TIME: CPT

## 2019-03-15 PROCEDURE — 93005 ELECTROCARDIOGRAM TRACING: CPT

## 2019-04-16 ENCOUNTER — HOSPITAL ENCOUNTER (OUTPATIENT)
Dept: HOSPITAL 8 - CACL | Age: 79
Discharge: HOME | End: 2019-04-16
Attending: INTERNAL MEDICINE
Payer: MEDICARE

## 2019-04-16 VITALS — BODY MASS INDEX: 29.55 KG/M2 | HEIGHT: 65 IN | WEIGHT: 177.36 LBS

## 2019-04-16 VITALS — DIASTOLIC BLOOD PRESSURE: 107 MMHG | SYSTOLIC BLOOD PRESSURE: 150 MMHG

## 2019-04-16 DIAGNOSIS — J44.9: ICD-10-CM

## 2019-04-16 DIAGNOSIS — F17.210: ICD-10-CM

## 2019-04-16 DIAGNOSIS — I48.91: Primary | ICD-10-CM

## 2019-04-16 DIAGNOSIS — I50.33: ICD-10-CM

## 2019-04-16 DIAGNOSIS — I11.0: ICD-10-CM

## 2019-04-16 DIAGNOSIS — Z79.01: ICD-10-CM

## 2019-04-16 LAB
ANION GAP SERPL CALC-SCNC: 5 MMOL/L (ref 5–15)
CALCIUM SERPL-MCNC: 8.3 MG/DL (ref 8.5–10.1)
CHLORIDE SERPL-SCNC: 102 MMOL/L (ref 98–107)
CREAT SERPL-MCNC: 0.62 MG/DL (ref 0.55–1.02)

## 2019-04-16 PROCEDURE — 36415 COLL VENOUS BLD VENIPUNCTURE: CPT

## 2019-04-16 PROCEDURE — 94640 AIRWAY INHALATION TREATMENT: CPT

## 2019-04-16 PROCEDURE — 92960 CARDIOVERSION ELECTRIC EXT: CPT

## 2019-04-16 PROCEDURE — 80048 BASIC METABOLIC PNL TOTAL CA: CPT

## 2019-04-16 PROCEDURE — 93005 ELECTROCARDIOGRAM TRACING: CPT

## 2020-07-09 ENCOUNTER — HOSPITAL ENCOUNTER (EMERGENCY)
Dept: HOSPITAL 8 - ED | Age: 80
End: 2020-07-09
Payer: MEDICARE

## 2020-07-09 VITALS — DIASTOLIC BLOOD PRESSURE: 77 MMHG | SYSTOLIC BLOOD PRESSURE: 143 MMHG

## 2020-07-09 VITALS — BODY MASS INDEX: 32.71 KG/M2 | HEIGHT: 64 IN | WEIGHT: 191.58 LBS
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
## 2020-07-09 DIAGNOSIS — S91.312A: Primary | ICD-10-CM

## 2020-07-09 DIAGNOSIS — F17.210: ICD-10-CM

## 2020-07-09 PROCEDURE — 12041 INTMD RPR N-HF/GENIT 2.5CM/<: CPT

## 2020-07-09 PROCEDURE — 99406 BEHAV CHNG SMOKING 3-10 MIN: CPT

## 2020-07-09 PROCEDURE — 90471 IMMUNIZATION ADMIN: CPT

## 2020-07-09 PROCEDURE — 90715 TDAP VACCINE 7 YRS/> IM: CPT

## 2020-07-09 PROCEDURE — 99284 EMERGENCY DEPT VISIT MOD MDM: CPT
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
## 2020-07-17 ENCOUNTER — OFFICE VISIT (OUTPATIENT)
Dept: ADMISSIONS | Facility: MEDICAL CENTER | Age: 80
End: 2020-07-17
Attending: COLON & RECTAL SURGERY
Payer: MEDICARE

## 2020-07-17 DIAGNOSIS — Z01.812 PRE-OPERATIVE LABORATORY EXAMINATION: ICD-10-CM

## 2020-07-17 DIAGNOSIS — Z01.812 PRE-PROCEDURAL LABORATORY EXAMINATION: ICD-10-CM

## 2020-07-17 LAB
ANION GAP SERPL CALC-SCNC: 11 MMOL/L (ref 7–16)
BUN SERPL-MCNC: 14 MG/DL (ref 8–22)
CALCIUM SERPL-MCNC: 9.5 MG/DL (ref 8.5–10.5)
CHLORIDE SERPL-SCNC: 95 MMOL/L (ref 96–112)
CO2 SERPL-SCNC: 28 MMOL/L (ref 20–33)
COVID ORDER STATUS COVID19: NORMAL
CREAT SERPL-MCNC: 0.51 MG/DL (ref 0.5–1.4)
ERYTHROCYTE [DISTWIDTH] IN BLOOD BY AUTOMATED COUNT: 53.4 FL (ref 35.9–50)
GLUCOSE SERPL-MCNC: 101 MG/DL (ref 65–99)
HCT VFR BLD AUTO: 46.9 % (ref 37–47)
HGB BLD-MCNC: 15.1 G/DL (ref 12–16)
MCH RBC QN AUTO: 33 PG (ref 27–33)
MCHC RBC AUTO-ENTMCNC: 32.2 G/DL (ref 33.6–35)
MCV RBC AUTO: 102.4 FL (ref 81.4–97.8)
PLATELET # BLD AUTO: 245 K/UL (ref 164–446)
PMV BLD AUTO: 8.8 FL (ref 9–12.9)
POTASSIUM SERPL-SCNC: 4.9 MMOL/L (ref 3.6–5.5)
RBC # BLD AUTO: 4.58 M/UL (ref 4.2–5.4)
SODIUM SERPL-SCNC: 134 MMOL/L (ref 135–145)
WBC # BLD AUTO: 6.1 K/UL (ref 4.8–10.8)

## 2020-07-17 PROCEDURE — 36415 COLL VENOUS BLD VENIPUNCTURE: CPT

## 2020-07-17 PROCEDURE — 85027 COMPLETE CBC AUTOMATED: CPT

## 2020-07-17 PROCEDURE — 80048 BASIC METABOLIC PNL TOTAL CA: CPT

## 2020-07-17 PROCEDURE — U0003 INFECTIOUS AGENT DETECTION BY NUCLEIC ACID (DNA OR RNA); SEVERE ACUTE RESPIRATORY SYNDROME CORONAVIRUS 2 (SARS-COV-2) (CORONAVIRUS DISEASE [COVID-19]), AMPLIFIED PROBE TECHNIQUE, MAKING USE OF HIGH THROUGHPUT TECHNOLOGIES AS DESCRIBED BY CMS-2020-01-R: HCPCS

## 2020-07-17 RX ORDER — FUROSEMIDE 40 MG/1
40 TABLET ORAL DAILY
COMMUNITY

## 2020-07-17 RX ORDER — METOPROLOL TARTRATE 50 MG/1
50 TABLET, FILM COATED ORAL DAILY
COMMUNITY

## 2020-07-17 RX ORDER — SPIRONOLACTONE 25 MG/1
25 TABLET ORAL DAILY
COMMUNITY

## 2020-07-17 RX ORDER — VALSARTAN 320 MG/1
320 TABLET ORAL DAILY
COMMUNITY

## 2020-07-17 RX ORDER — AMIODARONE HYDROCHLORIDE 200 MG/1
200 TABLET ORAL DAILY
COMMUNITY

## 2020-07-17 SDOH — HEALTH STABILITY: MENTAL HEALTH: HOW OFTEN DO YOU HAVE A DRINK CONTAINING ALCOHOL?: 2-3 TIMES A WEEK

## 2020-07-17 SDOH — HEALTH STABILITY: MENTAL HEALTH: HOW MANY STANDARD DRINKS CONTAINING ALCOHOL DO YOU HAVE ON A TYPICAL DAY?: 1 OR 2

## 2020-07-17 NOTE — OR NURSING
Recent cut on top of left foot, sutured, Cut on right shin, covered with tegaderm, no sutures. Dr Ha aware of both, per patient. Sutures will come out this weekend at [facility].

## 2020-07-18 LAB
SARS-COV-2 RNA RESP QL NAA+PROBE: NOTDETECTED
SPECIMEN SOURCE: NORMAL

## 2020-07-21 NOTE — OR NURSING
COVID-19 Pre-surgery screenin. Do you have an undiagnosed respiratory illness or symptoms such as coughing or sneezing? No   a. Onset of Sx No  b. Acute vs. chronic respiratory illness No    2. Do you have an unexplained fever greater than 100.4 degrees Fahrenheit or 38 degrees Celsius?     No     3. Have you had direct exposure to a patient who tested positive for Covid-19?    No     4. Have you traveled outside [facility] in the last 14 days? No    5. Have you had any loss of your sense of taste or smell? Have you had N/V or sore throat? No    Patient has been informed of visitor policy and asked to wear a mask upon entering the hospital   Yes

## 2020-07-22 ENCOUNTER — HOSPITAL ENCOUNTER (OUTPATIENT)
Facility: MEDICAL CENTER | Age: 80
End: 2020-07-22
Attending: COLON & RECTAL SURGERY | Admitting: COLON & RECTAL SURGERY
Payer: MEDICARE

## 2020-07-22 ENCOUNTER — ANESTHESIA EVENT (OUTPATIENT)
Dept: SURGERY | Facility: MEDICAL CENTER | Age: 80
End: 2020-07-22
Payer: MEDICARE

## 2020-07-22 ENCOUNTER — ANESTHESIA (OUTPATIENT)
Dept: SURGERY | Facility: MEDICAL CENTER | Age: 80
End: 2020-07-22
Payer: MEDICARE

## 2020-07-22 VITALS
BODY MASS INDEX: 32.26 KG/M2 | HEIGHT: 64 IN | RESPIRATION RATE: 18 BRPM | DIASTOLIC BLOOD PRESSURE: 90 MMHG | TEMPERATURE: 97.2 F | OXYGEN SATURATION: 94 % | SYSTOLIC BLOOD PRESSURE: 138 MMHG | WEIGHT: 188.93 LBS | HEART RATE: 77 BPM

## 2020-07-22 DIAGNOSIS — G89.18 POSTOPERATIVE PAIN: ICD-10-CM

## 2020-07-22 PROCEDURE — 160036 HCHG PACU - EA ADDL 30 MINS PHASE I: Performed by: COLON & RECTAL SURGERY

## 2020-07-22 PROCEDURE — C1781 MESH (IMPLANTABLE): HCPCS | Performed by: COLON & RECTAL SURGERY

## 2020-07-22 PROCEDURE — 700105 HCHG RX REV CODE 258: Performed by: COLON & RECTAL SURGERY

## 2020-07-22 PROCEDURE — A9270 NON-COVERED ITEM OR SERVICE: HCPCS | Performed by: ANESTHESIOLOGY

## 2020-07-22 PROCEDURE — 502571 HCHG PACK, LAP CHOLE: Performed by: COLON & RECTAL SURGERY

## 2020-07-22 PROCEDURE — 160009 HCHG ANES TIME/MIN: Performed by: COLON & RECTAL SURGERY

## 2020-07-22 PROCEDURE — 501570 HCHG TROCAR, SEPARATOR: Performed by: COLON & RECTAL SURGERY

## 2020-07-22 PROCEDURE — 501838 HCHG SUTURE GENERAL: Performed by: COLON & RECTAL SURGERY

## 2020-07-22 PROCEDURE — 160002 HCHG RECOVERY MINUTES (STAT): Performed by: COLON & RECTAL SURGERY

## 2020-07-22 PROCEDURE — 700111 HCHG RX REV CODE 636 W/ 250 OVERRIDE (IP): Performed by: ANESTHESIOLOGY

## 2020-07-22 PROCEDURE — 160025 RECOVERY II MINUTES (STATS): Performed by: COLON & RECTAL SURGERY

## 2020-07-22 PROCEDURE — 160035 HCHG PACU - 1ST 60 MINS PHASE I: Performed by: COLON & RECTAL SURGERY

## 2020-07-22 PROCEDURE — 160047 HCHG PACU  - EA ADDL 30 MINS PHASE II: Performed by: COLON & RECTAL SURGERY

## 2020-07-22 PROCEDURE — 700102 HCHG RX REV CODE 250 W/ 637 OVERRIDE(OP): Performed by: COLON & RECTAL SURGERY

## 2020-07-22 PROCEDURE — 501583 HCHG TROCAR, THRD CAN&SEAL 5X100: Performed by: COLON & RECTAL SURGERY

## 2020-07-22 PROCEDURE — 160048 HCHG OR STATISTICAL LEVEL 1-5: Performed by: COLON & RECTAL SURGERY

## 2020-07-22 PROCEDURE — 500064 HCHG BINDER, 4-PANEL MED/LG: Performed by: COLON & RECTAL SURGERY

## 2020-07-22 PROCEDURE — 700101 HCHG RX REV CODE 250: Performed by: COLON & RECTAL SURGERY

## 2020-07-22 PROCEDURE — 700102 HCHG RX REV CODE 250 W/ 637 OVERRIDE(OP): Performed by: ANESTHESIOLOGY

## 2020-07-22 PROCEDURE — A9270 NON-COVERED ITEM OR SERVICE: HCPCS | Performed by: COLON & RECTAL SURGERY

## 2020-07-22 PROCEDURE — 160039 HCHG SURGERY MINUTES - EA ADDL 1 MIN LEVEL 3: Performed by: COLON & RECTAL SURGERY

## 2020-07-22 PROCEDURE — 160046 HCHG PACU - 1ST 60 MINS PHASE II: Performed by: COLON & RECTAL SURGERY

## 2020-07-22 PROCEDURE — 501571 HCHG TROCAR, SEPARATOR 12X100: Performed by: COLON & RECTAL SURGERY

## 2020-07-22 PROCEDURE — 700101 HCHG RX REV CODE 250: Performed by: ANESTHESIOLOGY

## 2020-07-22 PROCEDURE — 160028 HCHG SURGERY MINUTES - 1ST 30 MINS LEVEL 3: Performed by: COLON & RECTAL SURGERY

## 2020-07-22 DEVICE — MESH VENTRALIGHT 4 X 6 INCH - (1EA/CA): Type: IMPLANTABLE DEVICE | Site: ABDOMEN | Status: FUNCTIONAL

## 2020-07-22 RX ORDER — OXYCODONE HCL 5 MG/5 ML
5 SOLUTION, ORAL ORAL
Status: COMPLETED | OUTPATIENT
Start: 2020-07-22 | End: 2020-07-22

## 2020-07-22 RX ORDER — ONDANSETRON 4 MG/1
4 TABLET, ORALLY DISINTEGRATING ORAL EVERY 6 HOURS PRN
Qty: 10 TAB | Refills: 0 | Status: SHIPPED | OUTPATIENT
Start: 2020-07-22

## 2020-07-22 RX ORDER — HYDROCODONE BITARTRATE AND ACETAMINOPHEN 5; 325 MG/1; MG/1
1 TABLET ORAL EVERY 4 HOURS PRN
Qty: 30 TAB | Refills: 0 | Status: SHIPPED | OUTPATIENT
Start: 2020-07-22 | End: 2020-07-27

## 2020-07-22 RX ORDER — SODIUM CHLORIDE, SODIUM LACTATE, POTASSIUM CHLORIDE, CALCIUM CHLORIDE 600; 310; 30; 20 MG/100ML; MG/100ML; MG/100ML; MG/100ML
INJECTION, SOLUTION INTRAVENOUS CONTINUOUS
Status: DISCONTINUED | OUTPATIENT
Start: 2020-07-22 | End: 2020-07-22 | Stop reason: HOSPADM

## 2020-07-22 RX ORDER — CEFAZOLIN SODIUM 1 G/3ML
INJECTION, POWDER, FOR SOLUTION INTRAMUSCULAR; INTRAVENOUS PRN
Status: DISCONTINUED | OUTPATIENT
Start: 2020-07-22 | End: 2020-07-22 | Stop reason: SURG

## 2020-07-22 RX ORDER — HYDROMORPHONE HYDROCHLORIDE 1 MG/ML
0.2 INJECTION, SOLUTION INTRAMUSCULAR; INTRAVENOUS; SUBCUTANEOUS
Status: DISCONTINUED | OUTPATIENT
Start: 2020-07-22 | End: 2020-07-22 | Stop reason: HOSPADM

## 2020-07-22 RX ORDER — HYDROMORPHONE HYDROCHLORIDE 1 MG/ML
0.4 INJECTION, SOLUTION INTRAMUSCULAR; INTRAVENOUS; SUBCUTANEOUS
Status: DISCONTINUED | OUTPATIENT
Start: 2020-07-22 | End: 2020-07-22 | Stop reason: HOSPADM

## 2020-07-22 RX ORDER — DIPHENHYDRAMINE HYDROCHLORIDE 50 MG/ML
12.5 INJECTION INTRAMUSCULAR; INTRAVENOUS
Status: DISCONTINUED | OUTPATIENT
Start: 2020-07-22 | End: 2020-07-22 | Stop reason: HOSPADM

## 2020-07-22 RX ORDER — ONDANSETRON 2 MG/ML
4 INJECTION INTRAMUSCULAR; INTRAVENOUS
Status: COMPLETED | OUTPATIENT
Start: 2020-07-22 | End: 2020-07-22

## 2020-07-22 RX ORDER — AMOXICILLIN 500 MG/1
500 CAPSULE ORAL 3 TIMES DAILY
Qty: 15 CAP | Refills: 0 | Status: SHIPPED | OUTPATIENT
Start: 2020-07-22 | End: 2020-07-27

## 2020-07-22 RX ORDER — OXYCODONE HCL 5 MG/5 ML
10 SOLUTION, ORAL ORAL
Status: COMPLETED | OUTPATIENT
Start: 2020-07-22 | End: 2020-07-22

## 2020-07-22 RX ORDER — HYDROMORPHONE HYDROCHLORIDE 1 MG/ML
0.1 INJECTION, SOLUTION INTRAMUSCULAR; INTRAVENOUS; SUBCUTANEOUS
Status: DISCONTINUED | OUTPATIENT
Start: 2020-07-22 | End: 2020-07-22 | Stop reason: HOSPADM

## 2020-07-22 RX ORDER — BUPIVACAINE HYDROCHLORIDE AND EPINEPHRINE 5; 5 MG/ML; UG/ML
INJECTION, SOLUTION EPIDURAL; INTRACAUDAL; PERINEURAL
Status: DISCONTINUED | OUTPATIENT
Start: 2020-07-22 | End: 2020-07-22 | Stop reason: HOSPADM

## 2020-07-22 RX ORDER — DEXAMETHASONE SODIUM PHOSPHATE 4 MG/ML
INJECTION, SOLUTION INTRA-ARTICULAR; INTRALESIONAL; INTRAMUSCULAR; INTRAVENOUS; SOFT TISSUE PRN
Status: DISCONTINUED | OUTPATIENT
Start: 2020-07-22 | End: 2020-07-22 | Stop reason: SURG

## 2020-07-22 RX ORDER — HALOPERIDOL 5 MG/ML
1 INJECTION INTRAMUSCULAR
Status: DISCONTINUED | OUTPATIENT
Start: 2020-07-22 | End: 2020-07-22 | Stop reason: HOSPADM

## 2020-07-22 RX ORDER — BACITRACIN 50000 [IU]/1
INJECTION, POWDER, FOR SOLUTION INTRAMUSCULAR
Status: DISCONTINUED | OUTPATIENT
Start: 2020-07-22 | End: 2020-07-22 | Stop reason: HOSPADM

## 2020-07-22 RX ORDER — ONDANSETRON 2 MG/ML
INJECTION INTRAMUSCULAR; INTRAVENOUS PRN
Status: DISCONTINUED | OUTPATIENT
Start: 2020-07-22 | End: 2020-07-22 | Stop reason: SURG

## 2020-07-22 RX ORDER — AMOXICILLIN 500 MG/1
500 CAPSULE ORAL 3 TIMES DAILY
Qty: 30 CAP | Refills: 0 | Status: SHIPPED | OUTPATIENT
Start: 2020-07-22 | End: 2020-07-22 | Stop reason: SDUPTHER

## 2020-07-22 RX ADMIN — FENTANYL CITRATE 25 MCG: 50 INJECTION INTRAMUSCULAR; INTRAVENOUS at 12:19

## 2020-07-22 RX ADMIN — POVIDONE-IODINE 15 ML: 10 SOLUTION TOPICAL at 10:42

## 2020-07-22 RX ADMIN — LIDOCAINE HYDROCHLORIDE 0.5 ML: 10 INJECTION, SOLUTION EPIDURAL; INFILTRATION; INTRACAUDAL at 10:42

## 2020-07-22 RX ADMIN — FENTANYL CITRATE 25 MCG: 50 INJECTION INTRAMUSCULAR; INTRAVENOUS at 12:17

## 2020-07-22 RX ADMIN — ONDANSETRON 4 MG: 2 INJECTION INTRAMUSCULAR; INTRAVENOUS at 11:35

## 2020-07-22 RX ADMIN — FENTANYL CITRATE 100 MCG: 50 INJECTION, SOLUTION INTRAMUSCULAR; INTRAVENOUS at 11:31

## 2020-07-22 RX ADMIN — HYDROMORPHONE HYDROCHLORIDE 0.4 MG: 1 INJECTION, SOLUTION INTRAMUSCULAR; INTRAVENOUS; SUBCUTANEOUS at 12:32

## 2020-07-22 RX ADMIN — SODIUM CHLORIDE, POTASSIUM CHLORIDE, SODIUM LACTATE AND CALCIUM CHLORIDE: 600; 310; 30; 20 INJECTION, SOLUTION INTRAVENOUS at 10:42

## 2020-07-22 RX ADMIN — FENTANYL CITRATE 150 MCG: 50 INJECTION, SOLUTION INTRAMUSCULAR; INTRAVENOUS at 11:36

## 2020-07-22 RX ADMIN — FENTANYL CITRATE 50 MCG: 50 INJECTION INTRAMUSCULAR; INTRAVENOUS at 12:21

## 2020-07-22 RX ADMIN — HYDROMORPHONE HYDROCHLORIDE 0.4 MG: 1 INJECTION, SOLUTION INTRAMUSCULAR; INTRAVENOUS; SUBCUTANEOUS at 12:27

## 2020-07-22 RX ADMIN — OXYCODONE HYDROCHLORIDE 5 MG: 5 SOLUTION ORAL at 12:14

## 2020-07-22 RX ADMIN — PROPOFOL 200 MG: 10 INJECTION, EMULSION INTRAVENOUS at 11:29

## 2020-07-22 RX ADMIN — ROCURONIUM BROMIDE 30 MG: 10 INJECTION, SOLUTION INTRAVENOUS at 11:29

## 2020-07-22 RX ADMIN — CEFAZOLIN 2 G: 330 INJECTION, POWDER, FOR SOLUTION INTRAMUSCULAR; INTRAVENOUS at 11:31

## 2020-07-22 RX ADMIN — HYDROMORPHONE HYDROCHLORIDE 0.2 MG: 1 INJECTION, SOLUTION INTRAMUSCULAR; INTRAVENOUS; SUBCUTANEOUS at 12:54

## 2020-07-22 RX ADMIN — HYDROMORPHONE HYDROCHLORIDE 0.4 MG: 1 INJECTION, SOLUTION INTRAMUSCULAR; INTRAVENOUS; SUBCUTANEOUS at 12:47

## 2020-07-22 RX ADMIN — HYDROMORPHONE HYDROCHLORIDE 0.2 MG: 1 INJECTION, SOLUTION INTRAMUSCULAR; INTRAVENOUS; SUBCUTANEOUS at 12:37

## 2020-07-22 RX ADMIN — DEXAMETHASONE SODIUM PHOSPHATE 8 MG: 4 INJECTION, SOLUTION INTRA-ARTICULAR; INTRALESIONAL; INTRAMUSCULAR; INTRAVENOUS; SOFT TISSUE at 11:36

## 2020-07-22 RX ADMIN — HYDROMORPHONE HYDROCHLORIDE 0.4 MG: 1 INJECTION, SOLUTION INTRAMUSCULAR; INTRAVENOUS; SUBCUTANEOUS at 12:42

## 2020-07-22 RX ADMIN — ONDANSETRON 4 MG: 2 INJECTION INTRAMUSCULAR; INTRAVENOUS at 14:46

## 2020-07-22 RX ADMIN — SUGAMMADEX 200 MG: 100 INJECTION, SOLUTION INTRAVENOUS at 11:45

## 2020-07-22 SDOH — HEALTH STABILITY: MENTAL HEALTH: HOW OFTEN DO YOU HAVE 6 OR MORE DRINKS ON ONE OCCASION?: NEVER

## 2020-07-22 ASSESSMENT — PAIN SCALES - GENERAL: PAIN_LEVEL: 1

## 2020-07-22 NOTE — OR NURSING
@1411 Pt arrived to Phase 2. Pt has complaints of moderate to severe abdominal pain during mobilization. Pt has no complaints of nausea. O2 saturation at 93% on 3L oxymask. Pt states she does not normally require oxygen during the day, only uses oxygen at night for sleep. Vital signs stable otherwise.    @1527 Pt O2 at 92% on 3L. Pt able to stand and sit with minor assistance from RN. Pts  states he can bring O2 tank from home to transport patient home and use Pts O2 concentrator at home afterwards.     @1610 Dr. Banegas notified of patients O2 status. Dr. Banegas okay with patient being discharged with home O2. Pts  to discharge room.    @1634 Pt ambulated from chair to bathroom, able to void without issue. Discharge instructions discussed with patient and patients  at bedside. Pt verbalizes understanding. PIV removed, Pt able to dress self with assistance of .    @1646 Pt discharged via wheelchair to home with Pts  and all belongings and prescriptions.

## 2020-07-22 NOTE — ANESTHESIA PROCEDURE NOTES
Airway    Date/Time: 7/22/2020 11:32 AM  Performed by: Satya Banegas M.D.  Authorized by: Satya Banegas M.D.     Location:  OR  Urgency:  Elective  Difficult Airway: No    Indications for Airway Management:  Anesthesia      Spontaneous Ventilation: present    Sedation Level:  Deep  Preoxygenated: Yes    Patient Position:  Sniffing  MILS Maintained Throughout: No    Mask Difficulty Assessment:  0 - not attempted  Final Airway Type:  Endotracheal airway  Final Endotracheal Airway:  ETT  Cuffed: Yes    Technique Used for Successful ETT Placement:  Direct laryngoscopy  Devices/Methods Used in Placement:  Intubating stylet    Blade Type:  Elly  Laryngoscope Blade/Videolaryngoscope Blade Size:  4  ETT Size (mm):  7.0  Measured from:  Gums  ETT to Gums (cm):  21  Placement Verified by: auscultation and capnometry    Cormack-Lehane Classification:  Grade IIa - partial view of glottis  Number of Attempts at Approach:  1

## 2020-07-22 NOTE — ANESTHESIA PREPROCEDURE EVALUATION
Relevant Problems   No relevant active problems       Physical Exam    Airway   Mallampati: II  TM distance: >3 FB  Neck ROM: full       Cardiovascular - normal exam  Rhythm: regular  Rate: normal     Dental - normal exam           Pulmonary   Breath sounds clear to auscultation     Abdominal - normal exam     Neurological - normal exam                 Anesthesia Plan    ASA 2       Plan - general       Airway plan will be ETT      Plan Factors:   Patient was not previously instructed to abstain from smoking on day of procedure.  Patient did not smoke on day of procedure.      Induction: intravenous          Informed Consent:    Anesthetic plan and risks discussed with patient.    Use of blood products discussed with: patient whom.

## 2020-07-22 NOTE — ANESTHESIA POSTPROCEDURE EVALUATION
Patient: Pilar Montero    Procedure Summary     Date:  07/22/20 Room / Location:  [place] [address] / SURGERY [facility]    Anesthesia Start:  1123 Anesthesia Stop:  1159    Procedure:  REPAIR, HERNIA, VENTRAL, LAPAROSCOPIC- INCISIONAL HERNIA WITH MESH (Abdomen) Diagnosis:  (INCISIONAL HERNIA)    Surgeon:  Kashif Ha M.D. Responsible Provider:  Satya Banegas M.D.    Anesthesia Type:  general ASA Status:  2          Final Anesthesia Type: general  Last vitals  BP   Blood Pressure : 147/95    Temp   36.6 °C (97.8 °F)    Pulse   Pulse: 85   Resp   20    SpO2   90 %      Anesthesia Post Evaluation    Patient location during evaluation: PACU  Patient participation: complete - patient participated  Level of consciousness: awake  Pain score: 1    Airway patency: patent  Anesthetic complications: no  Cardiovascular status: adequate  Respiratory status: acceptable  Hydration status: acceptable    PONV: none           Nurse Pain Score: 0 (NPRS)

## 2020-07-22 NOTE — OR NURSING
@1411 Pt arrived to Phase 2. Pt has complaints of moderate to severe abdominal pain during mobilization. Pt has no complaints of nausea. O2 saturation at 93% on 3L oxymask. Pt states she does not normally require oxygen during the day, only uses oxygen at night for sleep. Vital signs stable otherwise.

## 2020-07-22 NOTE — ANESTHESIA QCDR
2019 [facility] Clinical Data Registry (for Quality Improvement)     Postoperative nausea/vomiting risk protocol (Adult = 18 yrs and Pediatric 3-17 yrs)- (430 and 463)  General inhalation anesthetic (NOT TIVA) with PONV risk factors: Yes  Provision of anti-emetic therapy with at least 2 different classes of agents: Yes   Patient DID NOT receive anti-emetic therapy and reason is documented in Medical Record:  N/A    Multimodal Pain Management- (477)  Non-emergent surgery AND patient age >= 18: Yes  Use of Multimodal Pain Management, two or more drugs and/or interventions, NOT including systemic opioids: No  Exception: Documented allergy to multiple classes of analgesics: No       Smoking Abstinence (404)  Patient is current smoker (cigarette, pipe, e-cig, marijuanna): No  Elective Surgery:   Abstinence instructions provided prior to day of surgery:   Patient abstained from smoking on day of surgery:     Pre-Op Beta-Blocker in Isolated CABG (44)  Isolated CABG AND patient age >= 18: No  Beta-blocker admin within 24 hours of surgical incision:   Exception:of medical reason(s) for not administering beta blocker within 24 hours prior to surgical incision (e.g., not  indicated,other medical reason):     PACU assessment of acute postoperative pain prior to Anesthesia Care End- Applies to Patients Age = 18- (ABG7)  Initial PACU pain score is which of the following: < 7/10  Patient unable to report pain score: N/A    Post-anesthetic transfer of care checklist/protocol to PACU/ICU- (426 and 427)  Upon conclusion of case, patient transferred to which of the following locations: PACU/Non-ICU  Use of transfer checklist/protocol: Yes  Exclusion: Service Performed in Patient Hospital Room (and thus did not require transfer): N/A  Unplanned admission to ICU related to anesthesia service up through end of PACU care- (MD51)  Unplanned admission to ICU (not initially anticipated at anesthesia start time): No

## 2020-07-22 NOTE — OR NURSING
Pt on 2 L oxy-mask, pt breaths thru mouth.  Pt's baseline is 5 L NC at night, COPD.  No c/o nausea, tolerating PO fluids and medication.  Abdominal surgical pain now tolerable down from 8/10 to 5/10.  Abdominal binder and ice pack in place.  VSS, afebrile, VALDEZ, A/O x4.  Pt sleepy, wakes easily to voice.  Pt is [place].        Prescriptions for Norco, Zofran and Amoxil on pt chart.   Transferred to discharged on oxygen with surgical mask in place.   Oxygen tank 50% full.

## 2020-07-22 NOTE — ANESTHESIA TIME REPORT
Anesthesia Start and Stop Event Times     Date Time Event    7/22/2020 1111 Ready for Procedure     1123 Anesthesia Start     1159 Anesthesia Stop        Responsible Staff  07/22/20    Name Role Begin End    Satya Banegas M.D. Anesth 1123 1159        Preop Diagnosis (Free Text):  Pre-op Diagnosis     INCISIONAL HERNIA        Preop Diagnosis (Codes):    Post op Diagnosis  Incisional hernia      Premium Reason  Non-Premium    Comments:

## 2020-07-22 NOTE — DISCHARGE INSTRUCTIONS
ACTIVITY: Rest and take it easy for the first 24 hours.  A responsible adult is recommended to remain with you during that time.  It is normal to feel sleepy.  We encourage you to not do anything that requires balance, judgment or coordination.    MILD FLU-LIKE SYMPTOMS ARE NORMAL. YOU MAY EXPERIENCE GENERALIZED MUSCLE ACHES, THROAT IRRITATION, HEADACHE AND/OR SOME NAUSEA.    FOR 24 HOURS DO NOT:  Drive, operate machinery or run household appliances.  Drink beer or alcoholic beverages.   Make important decisions or sign legal documents.    SPECIAL INSTRUCTIONS:     Hernia Repair Discharge Instructions:   1. ACTIVITIES: Upon discharge from the hospital, the day of surgery it is requested that you do no significant physical activity and limit mental activities, as you have had sedation. The day after surgery, you may resume activities of daily living, but for 4-6 weeks, it is recommended that you do no strenuous activities or heavy lifting (greater than 15 pounds). You should continue to wear the abdominal binder for the next 4-6 weeks. This will help with discomfort and hernia recurrence.     2. DRIVING: You may drive whenever you are off pain medications and are able to perform the activities needed to drive, i.e. turning, bending, twisting, etc.     3. WOUND: It is not unusual for patients to experience swelling and even bruising at the hernia repair site.     4. ICE: please use ice on the wound to decrease the swelling for the first 24 hours and then discontinue.     5. BATHING: The dressing can be removed two days after surgery and you may then allow the wound to get wet in a shower as normal, but avoid submersion in water (tub bath) for at least a week.    6. HOME MEDICATIONS/PAIN MEDICATION: You may continue your home medications and you may restart your Eliquis tomorrow. You will be given a prescription for pain medication at discharge. Please take these as directed. It is important to remember not to take  medications on an empty stomach as this may cause nausea.     7. BOWEL FUNCTION: After hernia repair, it is not uncommon for patients to experience constipation. This is due to decreasing activity levels as well as pain medications. You may wish to use a stool softener beginning immediately after surgery, and you may or may not need to use a laxative (Miralax, Milk of Magnesia, Senokot, etc.) as well.    8. CALL IF YOU HAVE: (1) Fevers to more than 101.00 F, (2) Unusual chest or leg pain, (3) Drainage or fluid from incision that may be foul smelling, increased tenderness or soreness at the wound or the wound edges are no longer together, redness or swelling at the incision site. Please do not hesitate to call with any other questions.     9. APPOINTMENT: Contact our office at [phone] for a follow-up appointment in 1 to 2 weeks following your procedure. If you have any additional questions, please do not hesitate to call the office and speak to either myself or the physician on call.     Office address: [address] [place] [address], [address] [address] [facility] [phone]     DIET: To avoid nausea, slowly advance diet as tolerated, avoiding spicy or greasy foods for the first day.  Add more substantial food to your diet according to your physician's instructions.  Babies can be fed formula or breast milk as soon as they are hungry.  INCREASE FLUIDS AND FIBER TO AVOID CONSTIPATION.    SURGICAL DRESSING/BATHING: See special instructions    FOLLOW-UP APPOINTMENT:  A follow-up appointment should be arranged with your doctor in 1-2 weeks; call to schedule.    You should CALL YOUR PHYSICIAN if you develop:  Fever greater than 101 degrees F.  Pain not relieved by medication, or persistent nausea or vomiting.  Excessive bleeding (blood soaking through dressing) or unexpected drainage from the wound.  Extreme redness or swelling around the incision site, drainage of pus or foul smelling  drainage.  Inability to urinate or empty your bladder within 8 hours.  Problems with breathing or chest pain.    You should call 911 if you develop problems with breathing or chest pain.  If you are unable to contact your doctor or surgical center, you should go to the nearest emergency room or urgent care center.  Physician's telephone #: [phone]    If any questions arise, call your doctor.  If your doctor is not available, please feel free to call the Surgical Center at [phone].  The Center is open Monday through Friday from 7AM to 7PM.  You can also call the [facility] HOTLINE open 24 hours/day, 7 days/week and speak to a nurse at [phone], or toll free at [phone].    A registered nurse may call you a few days after your surgery to see how you are doing after your procedure.    MEDICATIONS: Resume taking daily medication.  Take prescribed pain medication with food.  If no medication is prescribed, you may take non-aspirin pain medication if needed.  PAIN MEDICATION CAN BE VERY CONSTIPATING.  Take a stool softener or laxative such as senokot, pericolace, or milk of magnesia if needed.    Prescription given for amoxicillin (antibiotic), norco (for pain), zofran (for nausea).  Last pain medication oxycodone 5mg given at 12:14 pm.    If your physician has prescribed pain medication that includes Acetaminophen (Tylenol), do not take additional Acetaminophen (Tylenol) while taking the prescribed medication.    Depression / Suicide Risk    As you are discharged from this [facility] facility, it is important to learn how to keep safe from harming yourself.    Recognize the warning signs:  · Abrupt changes in personality, positive or negative- including increase in energy   · Giving away possessions  · Change in eating patterns- significant weight changes-  positive or negative  · Change in sleeping patterns- unable to sleep or sleeping all the time   · Unwillingness or inability to  communicate  · Depression  · Unusual sadness, discouragement and loneliness  · Talk of wanting to die  · Neglect of personal appearance   · Rebelliousness- reckless behavior  · Withdrawal from people/activities they love  · Confusion- inability to concentrate     If you or a loved one observes any of these behaviors or has concerns about self-harm, here's what you can do:  · Talk about it- your feelings and reasons for harming yourself  · Remove any means that you might use to hurt yourself (examples: pills, rope, extension cords, firearm)  · Get professional help from the community (Mental Health, Substance Abuse, psychological counseling)  · Do not be alone:Call your Safe Contact- someone whom you trust who will be there for you.  · Call your local CRISIS HOTLINE 736-8016 or 692-360-0589  · Call your local Children's Mobile Crisis Response Team [place] (502) 842-0693 or www.[facility]  · Call the toll free National Suicide Prevention Hotlines   · National Suicide Prevention Lifeline 542-005-GWBM (9710)  · National Hope Line Network 800-SUICIDE (140-7236)

## 2021-01-05 ENCOUNTER — HOSPITAL ENCOUNTER (OUTPATIENT)
Dept: HOSPITAL 8 - CFH | Age: 81
Discharge: HOME | End: 2021-01-05
Attending: INTERNAL MEDICINE
Payer: MEDICARE

## 2021-01-05 DIAGNOSIS — I11.0: ICD-10-CM

## 2021-01-05 DIAGNOSIS — I08.3: Primary | ICD-10-CM

## 2021-01-05 DIAGNOSIS — I48.0: ICD-10-CM

## 2021-01-05 DIAGNOSIS — I50.32: ICD-10-CM

## 2021-01-05 PROCEDURE — 93017 CV STRESS TEST TRACING ONLY: CPT

## 2021-01-05 PROCEDURE — A9502 TC99M TETROFOSMIN: HCPCS

## 2021-01-05 PROCEDURE — 78452 HT MUSCLE IMAGE SPECT MULT: CPT

## 2021-01-05 PROCEDURE — 93306 TTE W/DOPPLER COMPLETE: CPT

## 2021-01-15 DIAGNOSIS — Z23 NEED FOR VACCINATION: ICD-10-CM

## 2021-02-18 ENCOUNTER — HOSPITAL ENCOUNTER (OUTPATIENT)
Dept: RADIOLOGY | Facility: MEDICAL CENTER | Age: 81
End: 2021-02-18
Attending: PHYSICIAN ASSISTANT
Payer: MEDICARE

## 2021-02-18 DIAGNOSIS — F17.210 CIGARETTE SMOKER: ICD-10-CM

## 2021-02-18 PROCEDURE — 71045 X-RAY EXAM CHEST 1 VIEW: CPT

## 2021-03-30 ENCOUNTER — HOSPITAL ENCOUNTER (OUTPATIENT)
Dept: HOSPITAL 8 - CFH | Age: 81
Discharge: HOME | End: 2021-03-30
Attending: PHYSICIAN ASSISTANT
Payer: MEDICARE

## 2021-03-30 DIAGNOSIS — R91.8: Primary | ICD-10-CM

## 2021-03-30 DIAGNOSIS — F17.210: ICD-10-CM

## 2021-03-30 DIAGNOSIS — J98.4: ICD-10-CM

## 2021-03-30 PROCEDURE — 71250 CT THORAX DX C-: CPT

## 2021-04-23 ENCOUNTER — HOSPITAL ENCOUNTER (OUTPATIENT)
Dept: HOSPITAL 8 - CFH | Age: 81
Discharge: HOME | End: 2021-04-23
Attending: INTERNAL MEDICINE
Payer: MEDICARE

## 2021-04-23 DIAGNOSIS — J98.11: ICD-10-CM

## 2021-04-23 DIAGNOSIS — J43.9: ICD-10-CM

## 2021-04-23 DIAGNOSIS — I51.7: ICD-10-CM

## 2021-04-23 DIAGNOSIS — R91.8: Primary | ICD-10-CM

## 2021-04-23 DIAGNOSIS — I34.8: ICD-10-CM

## 2021-04-23 PROCEDURE — 71250 CT THORAX DX C-: CPT

## 2021-05-03 ENCOUNTER — HOSPITAL ENCOUNTER (OUTPATIENT)
Dept: HOSPITAL 8 - STAR | Age: 81
Discharge: HOME | End: 2021-05-03
Attending: INTERNAL MEDICINE
Payer: MEDICARE

## 2021-05-03 DIAGNOSIS — I51.7: ICD-10-CM

## 2021-05-03 DIAGNOSIS — Z20.822: ICD-10-CM

## 2021-05-03 DIAGNOSIS — Z01.812: Primary | ICD-10-CM

## 2021-05-03 DIAGNOSIS — R91.8: ICD-10-CM

## 2021-05-03 LAB
ALBUMIN SERPL-MCNC: 3.4 G/DL (ref 3.4–5)
ALP SERPL-CCNC: 78 U/L (ref 45–117)
ALT SERPL-CCNC: 24 U/L (ref 12–78)
ANION GAP SERPL CALC-SCNC: 3 MMOL/L (ref 5–15)
BILIRUB SERPL-MCNC: 0.4 MG/DL (ref 0.2–1)
CALCIUM SERPL-MCNC: 8.5 MG/DL (ref 8.5–10.1)
CHLORIDE SERPL-SCNC: 101 MMOL/L (ref 98–107)
CREAT SERPL-MCNC: 0.68 MG/DL (ref 0.55–1.02)
PROT SERPL-MCNC: 7.4 G/DL (ref 6.4–8.2)

## 2021-05-03 PROCEDURE — 80053 COMPREHEN METABOLIC PANEL: CPT

## 2021-05-03 PROCEDURE — 93005 ELECTROCARDIOGRAM TRACING: CPT

## 2021-05-03 PROCEDURE — 36415 COLL VENOUS BLD VENIPUNCTURE: CPT

## 2021-05-03 PROCEDURE — U0003 INFECTIOUS AGENT DETECTION BY NUCLEIC ACID (DNA OR RNA); SEVERE ACUTE RESPIRATORY SYNDROME CORONAVIRUS 2 (SARS-COV-2) (CORONAVIRUS DISEASE [COVID-19]), AMPLIFIED PROBE TECHNIQUE, MAKING USE OF HIGH THROUGHPUT TECHNOLOGIES AS DESCRIBED BY CMS-2020-01-R: HCPCS

## 2021-05-07 ENCOUNTER — HOSPITAL ENCOUNTER (OUTPATIENT)
Dept: HOSPITAL 8 - OUT | Age: 81
Discharge: HOME | End: 2021-05-07
Attending: INTERNAL MEDICINE
Payer: MEDICARE

## 2021-05-07 VITALS — DIASTOLIC BLOOD PRESSURE: 79 MMHG | SYSTOLIC BLOOD PRESSURE: 144 MMHG

## 2021-05-07 VITALS — BODY MASS INDEX: 29.38 KG/M2 | HEIGHT: 65 IN | WEIGHT: 176.37 LBS

## 2021-05-07 DIAGNOSIS — Z79.899: ICD-10-CM

## 2021-05-07 DIAGNOSIS — Z90.49: ICD-10-CM

## 2021-05-07 DIAGNOSIS — I11.0: ICD-10-CM

## 2021-05-07 DIAGNOSIS — I50.32: ICD-10-CM

## 2021-05-07 DIAGNOSIS — Z99.81: ICD-10-CM

## 2021-05-07 DIAGNOSIS — C77.1: ICD-10-CM

## 2021-05-07 DIAGNOSIS — F17.210: ICD-10-CM

## 2021-05-07 DIAGNOSIS — Z79.01: ICD-10-CM

## 2021-05-07 DIAGNOSIS — R91.8: Primary | ICD-10-CM

## 2021-05-07 DIAGNOSIS — J44.9: ICD-10-CM

## 2021-05-07 DIAGNOSIS — R59.1: ICD-10-CM

## 2021-05-07 DIAGNOSIS — I48.0: ICD-10-CM

## 2021-05-07 DIAGNOSIS — Z88.5: ICD-10-CM

## 2021-05-07 DIAGNOSIS — Z88.8: ICD-10-CM

## 2021-05-07 PROCEDURE — 88342 IMHCHEM/IMCYTCHM 1ST ANTB: CPT

## 2021-05-07 PROCEDURE — 88173 CYTOPATH EVAL FNA REPORT: CPT

## 2021-05-07 PROCEDURE — 71045 X-RAY EXAM CHEST 1 VIEW: CPT

## 2021-05-07 PROCEDURE — 88341 IMHCHEM/IMCYTCHM EA ADD ANTB: CPT

## 2021-05-07 PROCEDURE — 88172 CYTP DX EVAL FNA 1ST EA SITE: CPT

## 2021-05-07 PROCEDURE — 31653 BRONCH EBUS SAMPLNG 3/> NODE: CPT

## 2021-05-07 PROCEDURE — 31622 DX BRONCHOSCOPE/WASH: CPT

## 2021-05-07 PROCEDURE — 88177 CYTP FNA EVAL EA ADDL: CPT

## 2021-05-07 PROCEDURE — 88305 TISSUE EXAM BY PATHOLOGIST: CPT

## 2021-05-18 ENCOUNTER — HOSPITAL ENCOUNTER (OUTPATIENT)
Dept: HOSPITAL 8 - PETCFH | Age: 81
Discharge: HOME | End: 2021-05-18
Attending: INTERNAL MEDICINE
Payer: MEDICARE

## 2021-05-18 DIAGNOSIS — C34.11: Primary | ICD-10-CM

## 2021-05-18 DIAGNOSIS — R91.8: ICD-10-CM

## 2021-05-18 PROCEDURE — 78815 PET IMAGE W/CT SKULL-THIGH: CPT

## 2021-05-18 PROCEDURE — A9552 F18 FDG: HCPCS

## 2021-05-25 ENCOUNTER — HOSPITAL ENCOUNTER (OUTPATIENT)
Dept: HOSPITAL 8 - ROC | Age: 81
Discharge: HOME | End: 2021-05-25
Attending: STUDENT IN AN ORGANIZED HEALTH CARE EDUCATION/TRAINING PROGRAM
Payer: MEDICARE

## 2021-05-25 DIAGNOSIS — J43.9: ICD-10-CM

## 2021-05-25 DIAGNOSIS — C34.11: Primary | ICD-10-CM

## 2021-05-25 DIAGNOSIS — Z99.81: ICD-10-CM

## 2021-05-25 DIAGNOSIS — I50.32: ICD-10-CM

## 2021-05-25 DIAGNOSIS — I48.0: ICD-10-CM

## 2021-05-25 DIAGNOSIS — Z79.01: ICD-10-CM

## 2021-05-25 DIAGNOSIS — Z79.899: ICD-10-CM

## 2021-05-25 DIAGNOSIS — Z87.891: ICD-10-CM

## 2021-05-25 DIAGNOSIS — I11.0: ICD-10-CM

## 2021-05-25 PROCEDURE — G0463 HOSPITAL OUTPT CLINIC VISIT: HCPCS

## 2021-06-08 ENCOUNTER — HOSPITAL ENCOUNTER (OUTPATIENT)
Dept: HOSPITAL 8 - CFH | Age: 81
Discharge: HOME | End: 2021-06-08
Attending: STUDENT IN AN ORGANIZED HEALTH CARE EDUCATION/TRAINING PROGRAM
Payer: MEDICARE

## 2021-06-08 DIAGNOSIS — C34.11: ICD-10-CM

## 2021-06-08 DIAGNOSIS — M47.812: ICD-10-CM

## 2021-06-08 DIAGNOSIS — C79.51: Primary | ICD-10-CM

## 2021-06-08 PROCEDURE — 70491 CT SOFT TISSUE NECK W/DYE: CPT

## 2021-07-23 ENCOUNTER — HOSPITAL ENCOUNTER (EMERGENCY)
Dept: HOSPITAL 8 - ED | Age: 81
Discharge: LEFT BEFORE DISCHARGE | End: 2021-07-23
Payer: MEDICARE

## 2021-07-23 VITALS — DIASTOLIC BLOOD PRESSURE: 50 MMHG | SYSTOLIC BLOOD PRESSURE: 136 MMHG

## 2021-07-23 VITALS — WEIGHT: 176.37 LBS | BODY MASS INDEX: 29.38 KG/M2 | HEIGHT: 65 IN

## 2021-07-23 DIAGNOSIS — M54.2: Primary | ICD-10-CM

## 2021-07-23 DIAGNOSIS — Z53.21: ICD-10-CM

## 2021-07-23 NOTE — NUR
PATIENT DOES NOT WANT TO WAIT TO BE SEEN, STATES "I WILL COME BACK LATER."  
PATIENT A&O X4, SIGNED AMA FORM, WHEELED OUT TO FRIENDS PRIVATE VEHICLE.

## 2021-07-23 NOTE — NUR
PATIENT WHEELED BACK FROM TRIAGE WITH CHIEF C/O NECK PAIN. PER PATIENT SHE WAS 
HERE A FEW WEEKS AGO GETTING AN MRI, STATES "'I WAS YANKED UP AND HEARD MY NECK 
POP, I'VE BEEN HAVING NECK PAIN SINCE, GETTING RADIATION TODAY AND MY DOCTOR 
NOTICED I WAS WEARING A C-COLLAR AND SENT ME TO ED." PATIENT ABLE TO GET FROM 
WHEELCHAIR TO GURNEY WITHOUT DIFFICULTY, C-COLLAR IN PLACE, FRIEND AT BEDSIDE, 
CALL LIGHT WITHIN REACH.

## 2021-07-26 ENCOUNTER — HOSPITAL ENCOUNTER (OUTPATIENT)
Dept: HOSPITAL 8 - ED | Age: 81
Setting detail: OBSERVATION
LOS: 1 days | Discharge: HOME | End: 2021-07-27
Attending: INTERNAL MEDICINE | Admitting: INTERNAL MEDICINE
Payer: MEDICARE

## 2021-07-26 VITALS — HEIGHT: 65 IN | BODY MASS INDEX: 30.05 KG/M2 | WEIGHT: 180.34 LBS

## 2021-07-26 VITALS — DIASTOLIC BLOOD PRESSURE: 81 MMHG | SYSTOLIC BLOOD PRESSURE: 145 MMHG

## 2021-07-26 VITALS — SYSTOLIC BLOOD PRESSURE: 113 MMHG | DIASTOLIC BLOOD PRESSURE: 72 MMHG

## 2021-07-26 DIAGNOSIS — M84.48XA: ICD-10-CM

## 2021-07-26 DIAGNOSIS — J96.11: ICD-10-CM

## 2021-07-26 DIAGNOSIS — L03.115: ICD-10-CM

## 2021-07-26 DIAGNOSIS — Z66: ICD-10-CM

## 2021-07-26 DIAGNOSIS — C79.51: ICD-10-CM

## 2021-07-26 DIAGNOSIS — Z51.5: ICD-10-CM

## 2021-07-26 DIAGNOSIS — I48.20: ICD-10-CM

## 2021-07-26 DIAGNOSIS — Z79.01: ICD-10-CM

## 2021-07-26 DIAGNOSIS — G89.3: Primary | ICD-10-CM

## 2021-07-26 DIAGNOSIS — J44.9: ICD-10-CM

## 2021-07-26 DIAGNOSIS — Z88.0: ICD-10-CM

## 2021-07-26 DIAGNOSIS — C34.92: ICD-10-CM

## 2021-07-26 DIAGNOSIS — Z99.81: ICD-10-CM

## 2021-07-26 DIAGNOSIS — M48.02: ICD-10-CM

## 2021-07-26 DIAGNOSIS — Z79.899: ICD-10-CM

## 2021-07-26 DIAGNOSIS — F17.210: ICD-10-CM

## 2021-07-26 DIAGNOSIS — I50.9: ICD-10-CM

## 2021-07-26 DIAGNOSIS — K52.1: ICD-10-CM

## 2021-07-26 DIAGNOSIS — T36.95XA: ICD-10-CM

## 2021-07-26 LAB
<PLATELET ESTIMATE>: ADEQUATE
ALBUMIN SERPL-MCNC: 3.2 G/DL (ref 3.4–5)
ALP SERPL-CCNC: 101 U/L (ref 45–117)
ALT SERPL-CCNC: 19 U/L (ref 12–78)
ANION GAP SERPL CALC-SCNC: 3 MMOL/L (ref 5–15)
ANISOCYTOSIS BLD QL SMEAR: (no result)
BASOPHILS # BLD AUTO: 0.1 X10^3/UL (ref 0–0.1)
BASOPHILS NFR BLD AUTO: 2 % (ref 0–1)
BILIRUB SERPL-MCNC: 0.2 MG/DL (ref 0.2–1)
CALCIUM SERPL-MCNC: 9.3 MG/DL (ref 8.5–10.1)
CHLORIDE SERPL-SCNC: 101 MMOL/L (ref 98–107)
CREAT SERPL-MCNC: 0.92 MG/DL (ref 0.55–1.02)
EOSINOPHIL # BLD AUTO: 0.1 X10^3/UL (ref 0–0.4)
EOSINOPHIL NFR BLD AUTO: 2 % (ref 1–7)
ERYTHROCYTE [DISTWIDTH] IN BLOOD BY AUTOMATED COUNT: 22.7 % (ref 9.6–15.2)
HYPOCHROMIA BLD QL SMEAR: (no result)
LYMPHOCYTES # BLD AUTO: 0.7 X10^3/UL (ref 1–3.4)
LYMPHOCYTES NFR BLD AUTO: 10 % (ref 22–44)
MCH RBC QN AUTO: 22.2 PG (ref 27–34.8)
MCHC RBC AUTO-ENTMCNC: 29.6 G/DL (ref 32.4–35.8)
MICROCYTES BLD QL SMEAR: (no result)
MONOCYTES # BLD AUTO: 1 X10^3/UL (ref 0.2–0.8)
MONOCYTES NFR BLD AUTO: 13 % (ref 2–9)
NEUTROPHILS # BLD AUTO: 5.6 X10^3/UL (ref 1.8–6.8)
NEUTROPHILS NFR BLD AUTO: 74 % (ref 42–75)
PLATELET # BLD AUTO: 299 X10^3/UL (ref 130–400)
PMV BLD AUTO: 6.6 FL (ref 7.4–10.4)
POLYCHROMASIA BLD QL SMEAR: (no result)
PROT SERPL-MCNC: 7.7 G/DL (ref 6.4–8.2)
RBC # BLD AUTO: 4.18 X10^6/UL (ref 3.82–5.3)
SMALL PLATELETS BLD QL SMEAR: (no result)

## 2021-07-26 PROCEDURE — 36415 COLL VENOUS BLD VENIPUNCTURE: CPT

## 2021-07-26 PROCEDURE — 85025 COMPLETE CBC W/AUTO DIFF WBC: CPT

## 2021-07-26 PROCEDURE — 84145 PROCALCITONIN (PCT): CPT

## 2021-07-26 PROCEDURE — 96375 TX/PRO/DX INJ NEW DRUG ADDON: CPT

## 2021-07-26 PROCEDURE — 96374 THER/PROPH/DIAG INJ IV PUSH: CPT

## 2021-07-26 PROCEDURE — 70491 CT SOFT TISSUE NECK W/DYE: CPT

## 2021-07-26 PROCEDURE — 72125 CT NECK SPINE W/O DYE: CPT

## 2021-07-26 PROCEDURE — 80053 COMPREHEN METABOLIC PANEL: CPT

## 2021-07-26 PROCEDURE — 93005 ELECTROCARDIOGRAM TRACING: CPT

## 2021-07-26 PROCEDURE — 99285 EMERGENCY DEPT VISIT HI MDM: CPT

## 2021-07-26 PROCEDURE — G0378 HOSPITAL OBSERVATION PER HR: HCPCS

## 2021-07-26 RX ADMIN — DOXYCYCLINE HYCLATE SCH MG: 100 TABLET, FILM COATED ORAL at 21:40

## 2021-07-26 NOTE — NUR
TASK RN NOTE:

UPON ENTRY TO ROOM PT ON O2 TANK ON BED. NC MOVED TO WALL. PER DAUGHTER SPO2 
"DIFFICULT TO READ" DUE TO CIRCULATION AND MOVEMENT. NAD NOTED IN PT AT THIS 
TIME. KNEES ELEVATED FOR PT COMFORT.  AND DAUGHTER AT BEDSIDE. 
ADDITIONAL CHAIRS BROUGHT IN FOR FAMILY.

## 2021-07-27 VITALS — DIASTOLIC BLOOD PRESSURE: 67 MMHG | SYSTOLIC BLOOD PRESSURE: 122 MMHG

## 2021-07-27 VITALS — SYSTOLIC BLOOD PRESSURE: 113 MMHG | DIASTOLIC BLOOD PRESSURE: 64 MMHG

## 2021-07-27 VITALS — SYSTOLIC BLOOD PRESSURE: 90 MMHG | DIASTOLIC BLOOD PRESSURE: 49 MMHG

## 2021-07-27 VITALS — SYSTOLIC BLOOD PRESSURE: 118 MMHG | DIASTOLIC BLOOD PRESSURE: 57 MMHG

## 2021-07-27 RX ADMIN — DOXYCYCLINE HYCLATE SCH MG: 100 TABLET, FILM COATED ORAL at 08:44

## 2021-08-30 NOTE — OP REPORT
NAME:  Pilar Montero  MRN:  [id number]  :  1940      DATE OF OPERATION: 2020    PREOPERATIVE DIAGNOSIS: Ventral Hernia    POSTOPERATIVE DIAGNOSIS: Ventral  Hernia    OPERATION PERFORMED:  Laparoscopic Ventral  Hernia Repair with Mesh; Adhesiolysis    SURGEON: Kashif Ha MD    ASSISTANT: Jasmyne Trimble PA-C    ANESTHESIOLOGIST:  Satya Banegas MD.  , MD    ANESTHESIA: General endotracheal anesthesia.      SPECIMEN: None    ESTIMATED BLOOD LOSS: <10 cc.     INDICATIONS: The patient is a 80 y.o. female with a history of symptomatic bulging mass. She is taken to the operating room today for laparoscopic hernia repair with mesh.     PROCEDURE: Following informed consent, the patient was properly identified, taken to the operating room, and placed in the supine position where general endotracheal anesthesia was administered. Intravenous antibiotics were administered by the anesthesiologist in the correct time interval. Sequential compression devices were employed. The abdomen was prepped and draped into a sterile field.     A bladeless optical entry trocar was carefully inserted into the abdomen and pneumoperitoneum was established in the usual fashion. A 5 mm lens/camera was passed into the peritoneal cavity.  Two additional separator trocars were placed under direct vision.     The abdominal wall was examined and demonstrated a ventral hernia with a 2-3 cm defect with adhesions, and without incarcerated bowel. Adhesiolysis was performed.  The omentum around the defect was reduced and cautery used to separate the omentum from the hernia sac and fascial edges.  Once the abdominal wall was completely exposed, a  piece of [id number] oval mesh was chosen and soaked in antibiotic solution.  Four O-Vicryl sutures were placed at the north, south, east and west margins of the mesh, and corresponding incision made in the abdominal wall at the superior margin of the intact fascia, for transfascial anchoring sutures.   The mesh was then rolled and introduced into the abdomen, then unfurled and oriented so as to place the woven mesh toward the hernia defect and the ends of the Vicryl were retrieved with the Endoclose device, and the mesh then further secured to the abdominal wall with the absorbable tacker device.  This resulted in a nice obliteration of the hernia defect with good orientation and placement.      Final inspections revealed a wide overlap and coverage of the fascial defect and a sturdy, effective repair.      The ports were removed and the abdomen desufflated. The trocars were removed under direct vision.  The port site skin incisions were closed with interrupted 4-0 Vicryl subcuticular sutures.  Steri-Strips and Benzoin were applied beneath sterile Band-Aids.     The patient tolerated the procedure well and there were no apparent complications. All sponge, needle, and instrument counts were correct on 2 separate occasions.  She was awakened, extubated, and transferred to the recovery room in satisfactory condition.       ____________________________________   Kashif Ha MD  DD: 7/22/2020  11:53 AM    CC:  [facility];             90.7

## 2022-10-19 NOTE — OR NURSING
Pt's  Myles phoned and updated on pt status in Recovery.  Myles is 20 minutes away from the hospital.    No

## (undated) DEVICE — GLOVE BIOGEL INDICATOR SZ 7.5 SURGICAL PF LTX - (50PR/BX 4BX/CA)

## (undated) DEVICE — CANNULA W/SEAL 5X100 Z-THRE - ADED KII (12/BX)

## (undated) DEVICE — BANDAID SHEER STRIP 3/4 IN (100EA/BX 12BX/CA)

## (undated) DEVICE — TROCAR 5X100 NON BLADED Z-TH - READ KII (6/BX)

## (undated) DEVICE — SPONGE GAUZESTER 4 X 4 4PLY - (128PK/CA)

## (undated) DEVICE — PROTECTOR ULNA NERVE - (36PR/CA)

## (undated) DEVICE — SODIUM CHL IRRIGATION 0.9% 1000ML (12EA/CA)

## (undated) DEVICE — SET EXTENSION WITH 2 PORTS (48EA/CA) ***PART #2C8610 IS A SUBSTITUTE*****

## (undated) DEVICE — CLOSURE SKIN STRIP 1/2 X 4 IN - (STERI STRIP) (50/BX 4BX/CA)

## (undated) DEVICE — STERI STRIP COMPOUND BENZOIN - TINCTURE 0.6ML WITH APPLICATOR (40EA/BX)

## (undated) DEVICE — ELECTRODE DUAL RETURN W/ CORD - (50/PK)

## (undated) DEVICE — MASK ANESTHESIA ADULT  - (100/CA)

## (undated) DEVICE — CANISTER SUCTION 3000ML MECHANICAL FILTER AUTO SHUTOFF MEDI-VAC NONSTERILE LF DISP  (40EA/CA)

## (undated) DEVICE — PACK LAP CHOLE OR - (2EA/CA)

## (undated) DEVICE — LACTATED RINGERS INJ 1000 ML - (14EA/CA 60CA/PF)

## (undated) DEVICE — NEPTUNE 4 PORT MANIFOLD - (20/PK)

## (undated) DEVICE — KIT ANESTHESIA W/CIRCUIT & 3/LT BAG W/FILTER (20EA/CA)

## (undated) DEVICE — TUBE NG SALEM SUMP 16FR (50EA/CA)

## (undated) DEVICE — ELECTRODE 850 FOAM ADHESIVE - HYDROGEL RADIOTRNSPRNT (50/PK)

## (undated) DEVICE — BINDER ABDOMINAL 30X45X12IN - FITS 30-45IN WAIST 12IN HIGH

## (undated) DEVICE — DEVICE 5MM ABSRB STRAP FIXATION  (6EA/BX)

## (undated) DEVICE — SET LEADWIRE 5 LEAD BEDSIDE DISPOSABLE ECG (1SET OF 5/EA)

## (undated) DEVICE — GOWN WARMING STANDARD FLEX - (30/CA)

## (undated) DEVICE — GLOVE SZ 7 BIOGEL PI MICRO - PF LF (50PR/BX 4BX/CA)

## (undated) DEVICE — TUBING CLEARLINK DUO-VENT - C-FLO (48EA/CA)

## (undated) DEVICE — HEAD HOLDER JUNIOR/ADULT

## (undated) DEVICE — SUTURE 4-0 VICRYL PLUSFS-1 - 27 INCH (36/BX)

## (undated) DEVICE — CHLORAPREP 26 ML APPLICATOR - ORANGE TINT(25/CA)

## (undated) DEVICE — SUTURE GENERAL

## (undated) DEVICE — SUTURE 1 VICRYL PLUS CTX - 36 INCH (36/BX)

## (undated) DEVICE — SENSOR SPO2 NEO LNCS ADHESIVE (20/BX) SEE USER NOTES

## (undated) DEVICE — SUCTION INSTRUMENT YANKAUER BULBOUS TIP W/O VENT (50EA/CA)

## (undated) DEVICE — SLEEVE, VASO, THIGH, MED

## (undated) DEVICE — SUTURE 0 VICRYL PLUS CT-1 - 36 INCH (36/BX)

## (undated) DEVICE — TROCAR Z THREAD12MM OPTICAL - NON BLADED (6/BX)